# Patient Record
Sex: MALE | Race: WHITE | NOT HISPANIC OR LATINO | Employment: OTHER | URBAN - METROPOLITAN AREA
[De-identification: names, ages, dates, MRNs, and addresses within clinical notes are randomized per-mention and may not be internally consistent; named-entity substitution may affect disease eponyms.]

---

## 2024-04-30 ENCOUNTER — TELEPHONE (OUTPATIENT)
Dept: UROLOGY | Facility: CLINIC | Age: 69
End: 2024-04-30

## 2024-04-30 ENCOUNTER — PREP FOR PROCEDURE (OUTPATIENT)
Dept: INTERVENTIONAL RADIOLOGY/VASCULAR | Facility: CLINIC | Age: 69
End: 2024-04-30

## 2024-04-30 DIAGNOSIS — N40.0 BENIGN PROSTATIC HYPERPLASIA, UNSPECIFIED WHETHER LOWER URINARY TRACT SYMPTOMS PRESENT: Primary | ICD-10-CM

## 2024-04-30 NOTE — TELEPHONE ENCOUNTER
----- Message from Umberto Monterroso MD sent at 4/30/2024 12:45 PM EDT -----  Jeff Patino,    Good call on having one of us perform a quick workup.  I think a cystoscopy would be indicated to get a sense of volume and the status of his bladder.  If he had this done at [facility], than no need to start at square one....    Willi Merrill is set up at our [place] office, or I am happy to see him at [place].    Melida could you set the patient up to see either one of us ASAP     Thanks!

## 2024-04-30 NOTE — TELEPHONE ENCOUNTER
I called and LVM for patient advising on him getting us records. [place] fax number was provided on

## 2024-05-01 NOTE — TELEPHONE ENCOUNTER
Pt called requesting mailing address for office to have previous urologist send records    Pt requesting to have a call to him once records are received.

## 2024-05-06 NOTE — TELEPHONE ENCOUNTER
Pt called and stated he was seen in the ER yesterday in [place] for a blocked catheter and has been irrigating it several times since but is still having issues. Pt looking for an appt sooner than next week. Informed pt we are still in need of records and advised him to request them once again.     Pt call back: [phone]

## 2024-05-06 NOTE — TELEPHONE ENCOUNTER
PT WAS NOTIFIED THAT THERE WERE NO SOONER APPTS. HE ALSO ASKED ME TO CALL HIS PREVIOUS UROLOGY OFFICE TO SEE IF THEY CAN FAX RECORDS INSTEAD OF MAILING THEM. WHEN I SPOKE WITH THEM THEY SAID THEY HAVE TO BE MAILED BECAUSE IT IS HANDLED BY A 3RD PARTY SERVICE AND THEY CAN'T FAX THEM. BUT SHE DID CHECK THE STATUS AND THEY WERE WORKING ON SENDING THEM.

## 2024-05-07 NOTE — TELEPHONE ENCOUNTER
Preethi warner's friend called to check if we received his medical records that were sent to us last week.     Please review

## 2024-05-07 NOTE — TELEPHONE ENCOUNTER
SPOKE WITH WHIT AND HE SAID THAT HE IS HAVING SOME RECORDS FAXED TO US POSSIBLY TODAY(5/7/24) AND THAT HE IS SENDING A DISC THROUGH THE MAIL FOR US TO GET UPLOADED TO HIS CHART.

## 2024-05-09 NOTE — PROGRESS NOTES
Referring Physician: No primary care provider on file.  A copy of this note was sent to the referring physician.       Diagnoses and all orders for this visit:    Urinary retention  -     Urine culture  -     CBC and Platelet; Future  -     Type and screen; Future  -     Protime-INR; Future  -     Basic metabolic panel; Future  -     Case request operating room: PROSTATECTOMY,SUPRAPUBIC LAPAROSCOPIC WITH ROBOTICS; Standing  -     Case request operating room: PROSTATECTOMY,SUPRAPUBIC LAPAROSCOPIC WITH ROBOTICS    Gross hematuria  -     CBC and Platelet; Future  -     Type and screen; Future  -     Protime-INR; Future  -     Basic metabolic panel; Future  -     Case request operating room: PROSTATECTOMY,SUPRAPUBIC LAPAROSCOPIC WITH ROBOTICS; Standing  -     Case request operating room: PROSTATECTOMY,SUPRAPUBIC LAPAROSCOPIC WITH ROBOTICS    Benign prostatic hyperplasia with urinary retention  -     CBC and Platelet; Future  -     Type and screen; Future  -     Protime-INR; Future  -     Basic metabolic panel; Future  -     Case request operating room: PROSTATECTOMY,SUPRAPUBIC LAPAROSCOPIC WITH ROBOTICS; Standing  -     Case request operating room: PROSTATECTOMY,SUPRAPUBIC LAPAROSCOPIC WITH ROBOTICS    Other orders  -     dutasteride (AVODART) 0.5 mg capsule; Take 0.5 mg by mouth daily  -     Multiple Vitamin (Multi-Vitamin) tablet; Take by mouth daily  -     Silodosin 8 MG CAPS  -     tadalafil (CIALIS) 10 MG tablet; Take 10 mg by mouth as needed  -     zolpidem (AMBIEN) 10 mg tablet; Take 10 mg by mouth daily as needed  -     Place sequential compression device; Standing  -     Nursing communication Patient instructions: Please drink 1 bottle 1 hour after dinner and 1 bottle 1 hour before bed on the night before your surgery. Please drink 1 hour before arriving to hospital for surgery; Standing  -     ceFAZolin (ANCEF) 2,000 mg in sodium chloride 0.9 % 50 mL IVPB            Assessment and plan:       1 catheter  dependent urinary retention and recalcitrant gross hematuria  -MRI demonstrates prostatic volumetrics of 5.1 x 6.3 x 7.6 (130-160 gram gland)  -Catheter dependent for 1 month, managed in [place]  -Cystoscopy performed today on the same day as my initial office evaluation demonstrates high volume bilobar hyperplasia with intravesical prostatic protrusion, mild to moderate bladder trabeculations, no bladder tumors, no focal or ongoing sources of hematuria are noted    2.  Mildly elevated PSA 4.8  -Outside MRI report reviewed dated May third 2024 describes 2 PI-RADS 3 lesions, we discussed that these are equivocal findings  -In my opinion, delaying surgical management for his urinary retention and significantly morbid gross hematuria to pursue a prostate biopsy for these equivocal findings is not recommended  - We discussed the tissue sampling will be obtained at the time of his suprapubic prostatectomy and then if cancer is identified in the specimen he would require radiation thereafter for definitive management    Cystoscopy was offered today in order to provide direct visualization of the lower urinary tract and provided guidance regarding different management options.  I recommended flexible cystoscopy for further evaluation.  Discussed the risks benefits and alternatives to this diagnostic procedure.  Risks include but are not limited to hematuria, pain, urinary tract infection, stricture formation, possible need for hospitalization.  Patient verbalized understanding and has elected to proceed.      Reviewed surgical management options for the patient's high-volume BPH.  Clearly he has ongoing morbidity with frequent ER visits due to ongoing gross hematuria.  I do not visualize any acute or focal bleeding on today cystoscopy that I think could be palliated with a cystoscopic procedure    We also discussed the patient's MRI findings which are equivocal for possible prostate cancer, PI-RADS 3.  Discussed that  typically PI-RADS 3 lesions are observed.    The patient has been scheduled for a biopsy with his established urologist and he has also gone ahead and schedule prostatic arterial embolization    We did discuss surgical options as well particular the form of a suprapubic prostatectomy.With regards to surgical resection, we discussed the benefits and risks, including but not limited to bleeding which may or may not require blood transfusion, infection, injury to other structures (bladder, ureters, rectum, nerves, & vascular /neural structures), ileus, DVT/PE, MI, CVA, urinary incontinence, impotence, failure to completely empty after the procedure. He had the opportunity to ask questions and his questions were answered to his satisfaction.    Percy has elected to move forward with a robotic suprapubic prostatectomy.  I have had a phone dialogue with my partner Dr. Cowan who performs a high-volume of this procedures and believes he can have the patient scheduled within the next 6 weeks.    A preop urine culture and preoperative labs were collected today    I have spent 75 minutes with Patient and family today in which greater than 50% of this time was spent in counseling/coordination of care regarding Diagnostic results, Prognosis, Risks and benefits of tx options, Instructions for management, Counseling / Coordination of care, Obtaining or reviewing history  , and Communicating with other healthcare professionals .    Please note this time includes cumulative time on the day of encounter, including reviewing medical records and/or coordinating care among the patient's other specialists.     Umberto Monterroso MD      Chief Complaint     Second opinion BPH      History of Present Illness     Percy Duenas is a 68 y.o. presents seeking additional opinion for BPH with catheter dependent urinary retention    Detailed Urologic History     - please refer to HPI    Review of Systems     Review of Systems    Constitutional: Negative for activity change and fatigue.   HENT: Negative for congestion.    Eyes: Negative for visual disturbance.   Respiratory: Negative for shortness of breath and wheezing.    Cardiovascular: Negative for chest pain and leg swelling.   Gastrointestinal: Negative for abdominal pain.   Endocrine: Negative for polyuria.   Genitourinary: Negative for dysuria, flank pain, hematuria and urgency.   Musculoskeletal: Negative for back pain.   Allergic/Immunologic: Negative for immunocompromised state.   Neurological: Negative for dizziness and numbness.   Psychiatric/Behavioral: Negative for dysphoric mood.   All other systems reviewed and are negative.          Allergies     Not on File    Physical Exam       Physical Exam  Constitutional:       General: He is not in acute distress.     Appearance: He is well-developed.   HENT:      Head: Normocephalic and atraumatic.   Cardiovascular:      Comments: Negative lower extremity edema  Pulmonary:      Effort: Pulmonary effort is normal.      Breath sounds: Normal breath sounds.   Abdominal:      Palpations: Abdomen is soft.   Musculoskeletal:         General: Normal range of motion.      Cervical back: Normal range of motion.   Skin:     General: Skin is warm.   Neurological:      Mental Status: He is alert and oriented to person, place, and time.   Psychiatric:         Behavior: Behavior normal.           Vital Signs  There were no vitals filed for this visit.      Current Medications     No current outpatient medications on file.      Active Problems     There is no problem list on file for this patient.        Past Medical History     No past medical history on file.      Surgical History     No past surgical history on file.      Family History     No family history on file.      Social History     Social History     Social History     Tobacco Use   Smoking Status Not on file   Smokeless Tobacco Not on file         Pertinent Lab Values     No results  "found for: \"CREATININE\"    No results found for: \"PSA\"    @RESULTRCNT(1H])@      Pertinent Imaging       No results found.      Portions of the record may have been created with voice recognition software.  Occasional wrong word or \"sound a like\" substitutions may have occurred due to the inherent limitations of voice recognition software.  In addition some of the content generated from this outpatient encounter includes information designed for patient education and/or communication back to the referring provider.  Read the chart carefully and recognize, using context, where substitutions have occurred.    "

## 2024-05-15 ENCOUNTER — APPOINTMENT (OUTPATIENT)
Dept: LAB | Facility: AMBULARY SURGERY CENTER | Age: 69
End: 2024-05-15
Payer: COMMERCIAL

## 2024-05-15 ENCOUNTER — OFFICE VISIT (OUTPATIENT)
Dept: UROLOGY | Facility: CLINIC | Age: 69
End: 2024-05-15
Payer: COMMERCIAL

## 2024-05-15 ENCOUNTER — TELEPHONE (OUTPATIENT)
Dept: UROLOGY | Facility: CLINIC | Age: 69
End: 2024-05-15

## 2024-05-15 VITALS
HEART RATE: 76 BPM | SYSTOLIC BLOOD PRESSURE: 126 MMHG | HEIGHT: 72 IN | BODY MASS INDEX: 28.71 KG/M2 | WEIGHT: 212 LBS | DIASTOLIC BLOOD PRESSURE: 84 MMHG | OXYGEN SATURATION: 99 %

## 2024-05-15 DIAGNOSIS — N40.1 BENIGN PROSTATIC HYPERPLASIA WITH URINARY RETENTION: ICD-10-CM

## 2024-05-15 DIAGNOSIS — R31.0 GROSS HEMATURIA: ICD-10-CM

## 2024-05-15 DIAGNOSIS — R33.9 URINARY RETENTION: Primary | ICD-10-CM

## 2024-05-15 DIAGNOSIS — R33.8 BENIGN PROSTATIC HYPERPLASIA WITH URINARY RETENTION: ICD-10-CM

## 2024-05-15 DIAGNOSIS — R33.9 URINARY RETENTION: ICD-10-CM

## 2024-05-15 LAB
ABO GROUP BLD: NORMAL
ANION GAP SERPL CALCULATED.3IONS-SCNC: 8 MMOL/L (ref 4–13)
BLD GP AB SCN SERPL QL: NEGATIVE
BUN SERPL-MCNC: 12 MG/DL (ref 5–25)
CALCIUM SERPL-MCNC: 9.2 MG/DL (ref 8.4–10.2)
CHLORIDE SERPL-SCNC: 106 MMOL/L (ref 96–108)
CO2 SERPL-SCNC: 26 MMOL/L (ref 21–32)
CREAT SERPL-MCNC: 1.24 MG/DL (ref 0.6–1.3)
ERYTHROCYTE [DISTWIDTH] IN BLOOD BY AUTOMATED COUNT: 12.2 % (ref 11.6–15.1)
GFR SERPL CREATININE-BSD FRML MDRD: 58 ML/MIN/1.73SQ M
GLUCOSE P FAST SERPL-MCNC: 107 MG/DL (ref 65–99)
HCT VFR BLD AUTO: 37.5 % (ref 36.5–49.3)
HGB BLD-MCNC: 11.9 G/DL (ref 12–17)
INR PPP: 1.07 (ref 0.84–1.19)
MCH RBC QN AUTO: 36.8 PG (ref 26.8–34.3)
MCHC RBC AUTO-ENTMCNC: 31.7 G/DL (ref 31.4–37.4)
MCV RBC AUTO: 116 FL (ref 82–98)
PLATELET # BLD AUTO: 258 THOUSANDS/UL (ref 149–390)
PMV BLD AUTO: 10.6 FL (ref 8.9–12.7)
POTASSIUM SERPL-SCNC: 4.4 MMOL/L (ref 3.5–5.3)
PROTHROMBIN TIME: 13.8 SECONDS (ref 11.6–14.5)
RBC # BLD AUTO: 3.23 MILLION/UL (ref 3.88–5.62)
RH BLD: POSITIVE
SODIUM SERPL-SCNC: 140 MMOL/L (ref 135–147)
SPECIMEN EXPIRATION DATE: NORMAL
WBC # BLD AUTO: 10.23 THOUSAND/UL (ref 4.31–10.16)

## 2024-05-15 PROCEDURE — 85610 PROTHROMBIN TIME: CPT

## 2024-05-15 PROCEDURE — 86850 RBC ANTIBODY SCREEN: CPT

## 2024-05-15 PROCEDURE — 87086 URINE CULTURE/COLONY COUNT: CPT | Performed by: UROLOGY

## 2024-05-15 PROCEDURE — 99205 OFFICE O/P NEW HI 60 MIN: CPT | Performed by: UROLOGY

## 2024-05-15 PROCEDURE — 87077 CULTURE AEROBIC IDENTIFY: CPT | Performed by: UROLOGY

## 2024-05-15 PROCEDURE — 80048 BASIC METABOLIC PNL TOTAL CA: CPT

## 2024-05-15 PROCEDURE — 36415 COLL VENOUS BLD VENIPUNCTURE: CPT

## 2024-05-15 PROCEDURE — 86900 BLOOD TYPING SEROLOGIC ABO: CPT

## 2024-05-15 PROCEDURE — 86901 BLOOD TYPING SEROLOGIC RH(D): CPT

## 2024-05-15 PROCEDURE — 85027 COMPLETE CBC AUTOMATED: CPT

## 2024-05-15 RX ORDER — ZOLPIDEM TARTRATE 10 MG/1
10 TABLET ORAL DAILY PRN
COMMUNITY

## 2024-05-15 RX ORDER — SILODOSIN 8 MG/1
CAPSULE ORAL
COMMUNITY
Start: 2024-02-16

## 2024-05-15 RX ORDER — DUTASTERIDE 0.5 MG/1
0.5 CAPSULE, LIQUID FILLED ORAL DAILY
COMMUNITY
Start: 2024-04-28

## 2024-05-15 RX ORDER — TADALAFIL 10 MG/1
10 TABLET ORAL AS NEEDED
COMMUNITY
Start: 2024-03-16

## 2024-05-17 ENCOUNTER — TELEPHONE (OUTPATIENT)
Age: 69
End: 2024-05-17

## 2024-05-17 DIAGNOSIS — N39.0 URINARY TRACT INFECTION WITHOUT HEMATURIA, SITE UNSPECIFIED: Primary | ICD-10-CM

## 2024-05-17 LAB — BACTERIA UR CULT: ABNORMAL

## 2024-05-17 RX ORDER — NITROFURANTOIN 25; 75 MG/1; MG/1
100 CAPSULE ORAL 2 TIMES DAILY
Qty: 10 CAPSULE | Refills: 0 | Status: SHIPPED | OUTPATIENT
Start: 2024-05-17

## 2024-05-17 NOTE — TELEPHONE ENCOUNTER
Pt called, sts that he was notified on MyChart that his lab results were in and pt sts that it looks like results are showing that he has UTI.  Pt requesting call to discuss results from 5/15/24.    Pt call back: [phone]

## 2024-05-17 NOTE — TELEPHONE ENCOUNTER
Patient called again for results and for an antibiotic. IF a script is needed please send to:  [facility] [place], [place] - [address]  [[id number]]     Explained that provider will review results and determine if antibiotic is needed.     Urine culture  Order: [id number]   Status: Final result       Visible to patient: Yes (seen)       Next appt: 06/03/2024 at 08:30 AM in Radiology (BE IR 3 (DISCOVERY))       Dx: Urinary retention    Specimen Information: Urine, Clean Catch   0 Result Notes  Urine Culture 50,000-59,000 cfu/ml Staphylococcus saprophyticus Abnormal    Routine testing of urine isolates of S. saprophyticus is not recommended per CLSI guidelines.  S. saprophyticus acute, uncomplicated urinary tract infections respond to concentrations achieved in urine of antimicrobial agents commonly used to treat them.  Antimicrobials commonly used to treat acute, uncomplicated UTI's are nitrofurantoin, fluoroquinolones or trimethoprim with or without sulfamethoxazole.

## 2024-05-20 ENCOUNTER — TELEPHONE (OUTPATIENT)
Age: 69
End: 2024-05-20

## 2024-05-20 NOTE — TELEPHONE ENCOUNTER
Patient called today stating that he's interested in scheduling robotic surgery of the prostate with Dr. Monterroso.    Please review.    Call back [phone]

## 2024-05-20 NOTE — TELEPHONE ENCOUNTER
On Wednesday 5/15 Dr. Monterroso placed a case for a RALP.     Called and spoke with patient and let him know that Cathleen is out of office, and will call him back this week. Patient understands.

## 2024-05-24 ENCOUNTER — PREP FOR PROCEDURE (OUTPATIENT)
Dept: UROLOGY | Facility: AMBULATORY SURGERY CENTER | Age: 69
End: 2024-05-24

## 2024-05-24 DIAGNOSIS — R33.8 BENIGN PROSTATIC HYPERPLASIA WITH URINARY RETENTION: Primary | ICD-10-CM

## 2024-05-24 DIAGNOSIS — N40.1 BENIGN PROSTATIC HYPERPLASIA WITH URINARY RETENTION: Primary | ICD-10-CM

## 2024-05-24 DIAGNOSIS — Z79.01 LONG TERM (CURRENT) USE OF ANTICOAGULANTS: ICD-10-CM

## 2024-05-24 DIAGNOSIS — N39.0 URINARY TRACT INFECTION WITHOUT HEMATURIA, SITE UNSPECIFIED: ICD-10-CM

## 2024-05-24 DIAGNOSIS — Z01.812 PRE-OPERATIVE LABORATORY EXAMINATION: ICD-10-CM

## 2024-05-24 DIAGNOSIS — Z01.810 PRE-OPERATIVE CARDIOVASCULAR EXAMINATION: ICD-10-CM

## 2024-05-24 DIAGNOSIS — Z01.818 PREOP EXAMINATION: ICD-10-CM

## 2024-05-24 NOTE — TELEPHONE ENCOUNTER
Spoke with patient and confirmed surgery date of: 6/26/2024  Type of surgery: Robotic Suprapubic Prostatectomy  Operating physician: Dr. Cowan  Location of surgery: [facility]    Verbally went over prep with patient on: 5/24/2024  NPO  Bowel prep? Yes  Hospital calls afternoon prior with arrival time -Calls Friday afternoon for Monday surgeries  Patient needs ride to and from surgery outpatient w/ 23 HR hold for observation  Pre-op testing to be done 2 weeks prior to surgery  CBC, BMP, PT/INR, PTT, Type and Screen, Urine Culture, EKG  Blood thinners:   Aspirin and vitamins 1 week prior to surgery  Clearances needed: Medical    Mailed/emailed to patient on:  Copy of packet scanned into Media on: 5/29/2024  Labs in packet  Soap / Bowel prep in packet  Pre-op & Post-op in packet  Dates of H&P and post-op if needed    Consent: in Media   If needed:    Medical Clearance:  Appt with: Patient to schedule  Appt date and time:  Date clearance form faxed:  Best fax number:

## 2024-05-29 NOTE — TELEPHONE ENCOUNTER
Verbally confirmed the appointment with Dr. Cowan on 06- with an arrival time of 10:45 at our [place] office, the address was provided to the patient.    Patient inquired if the PAT testing could be completed the same day as the pre-op appointment: he can have them done at any [facility]. walk in no appt needed. these are to be done 2 weeks prior to surgery.    Patient inquired why he needed to see his MD prior to seeing Dr. Cowan:-pre surgical clearance-review medical history and do a physical to make sure you are safe for surgery and anesthesia.    In addition I informed him he should receive his packet next week.    I had help with this VIA teams with Queenie Singh.

## 2024-05-31 NOTE — TELEPHONE ENCOUNTER
I called pt and I was able to speak with him. I verbally went over all of his questions. Will have his pre op testing completed on 6/7/24 after his appt with Dr. Cowan at the [place] office. Per request I emailed pt a copy of the directions for the [facility].   Impaired per MOCA (9 words, most of them adjectives)/Impaired naming Impaired naming

## 2024-05-31 NOTE — TELEPHONE ENCOUNTER
Pt called, sts that he is scheduled to have his PAT on 6/6/24 and has questions regarding how long it is going to take him to complete the labs and EKG.  Pt sts that he would liek to move his Pre Op appt with Dr. Cowan for the same say he is having PAT, but he needs to know how long his PAT will take to make arrangements to allow enough time to make it to Dr. Cowan appt.  Pt is requesting call back from  to go over it all.    Pt call back: [phone]

## 2024-06-06 NOTE — H&P (VIEW-ONLY)
Assessment/Plan:    Urinary retention  The patient has persistent urinary tension with a very enlarged prostate.  He has previous discussed options with Dr. Monterroso and elected to move forward with robotic prostatectomy.  We discussed this in more detail today as well as other options such as HoLEP and aqua ablation.      After discussion he still wants to move forward with robotic simple prostatectomy as scheduled for June 26.    We discussed the risks and benefits of a robotic simple prostatectomy.  It is almost guaranteed he will have some degree of urinary continence at first which should improve over time but in some and does not.  Other less common risks are urine leak as well as bleeding and regrowth of prostatic tissue over time.  There is also a small but real risk for bowel or major vascular injury.  We also discussed that surgery is very effective at helping with obstructive symptoms of does not always help with frequency and urgency issues.  Consent was obtained.          Subjective:      Patient ID: Percy Duenas is a 68 y.o. male.    HPI  Percy Duenas is a 68 y.o. presents with catheter dependent urinary retention associated with a large prostate who is scheduled for robot simple prostatectomy.    The patient has had a catheter in place for 1.5 months.  This has been associated with development of persistent clots for which she has been self irrigating.  He was seen by Dr. Monterroso to perform cystoscopy showing bilobar hyperplasia with intravesical prostatic extrusion with mild to moderate bladder trabeculations.  The patient's MRI of the prostate showed a gland measuring approximately 160 g in size.    The patient is eager to have surgery as he wants his catheter out as soon as possible.    The patient had an outside PSA which was apparently 4.8 resulting in a outside MRI report dated May third 2024 describes 2 PI-RADS 3 lesions and a conversation was had about these equivocal findings with  Dr. Monterroso and role for biopsy versus moving forward with surgery which will produce tissue sampling and potentially require adjuvant radiation if clinically significant prostate cancer is found    Patient has a prior history of gallbladder surgery after which she had significant gas pain.    Is not a blood thinners  Denies any cardiac issues    Past Surgical History:   Procedure Laterality Date    GALLBLADDER SURGERY          History reviewed. No pertinent past medical history.     AUA SYMPTOM SCORE      Flowsheet Row Most Recent Value   AUA SYMPTOM SCORE    How often have you had a sensation of not emptying your bladder completely after you finished urinating? 3 (P)     How often have you had to urinate again less than two hours after you finished urinating? 5 (P)     How often have you found you stopped and started again several times when you urinate? 1 (P)     How often have you found it difficult to postpone urination? 5 (P)     How often have you had a weak urinary stream? 5 (P)     How often have you had to push or strain to begin urination? 0 (P)     How many times did you most typically get up to urinate from the time you went to bed at night until the time you got up in the morning? 5 (P)     Quality of Life: If you were to spend the rest of your life with your urinary condition just the way it is now, how would you feel about that? 4 (P)     AUA SYMPTOM SCORE 24 (P)               Review of Systems   Constitutional:  Negative for chills and fever.   HENT:  Negative for ear pain and sore throat.    Eyes:  Negative for pain and visual disturbance.   Respiratory:  Negative for cough and shortness of breath.    Cardiovascular:  Negative for chest pain and palpitations.   Gastrointestinal:  Negative for abdominal pain and vomiting.   Genitourinary:  Positive for difficulty urinating. Negative for dysuria and hematuria.   Musculoskeletal:  Negative for arthralgias and back pain.   Skin:  Negative for color  "change and rash.   Neurological:  Negative for seizures and syncope.   All other systems reviewed and are negative.        Objective:      /72 (BP Location: Left arm, Patient Position: Sitting, Cuff Size: Standard)   Pulse 63   Ht 6' (1.829 m)   Wt 96.6 kg (213 lb)   SpO2 99%   BMI 28.89 kg/m²     No results found for: \"PSA\"       Physical Exam  Vitals reviewed.   Constitutional:       Appearance: Normal appearance. He is normal weight.   HENT:      Head: Normocephalic and atraumatic.   Eyes:      Pupils: Pupils are equal, round, and reactive to light.   Abdominal:      General: Abdomen is flat. There is no distension.      Palpations: There is no mass.      Tenderness: There is no abdominal tenderness. There is no right CVA tenderness, left CVA tenderness, guarding or rebound.      Hernia: A hernia is present.      Comments: Very small umbilical hernia   Genitourinary:     Comments: Davis catheter in place  Neurological:      General: No focal deficit present.      Mental Status: He is alert and oriented to person, place, and time.   Psychiatric:         Mood and Affect: Mood normal.         Thought Content: Thought content normal.           Orders  No orders of the defined types were placed in this encounter.    "

## 2024-06-07 ENCOUNTER — OFFICE VISIT (OUTPATIENT)
Dept: UROLOGY | Facility: AMBULATORY SURGERY CENTER | Age: 69
End: 2024-06-07
Payer: COMMERCIAL

## 2024-06-07 ENCOUNTER — LAB REQUISITION (OUTPATIENT)
Dept: LAB | Facility: HOSPITAL | Age: 69
End: 2024-06-07
Payer: COMMERCIAL

## 2024-06-07 ENCOUNTER — APPOINTMENT (OUTPATIENT)
Dept: LAB | Facility: CLINIC | Age: 69
End: 2024-06-07
Payer: COMMERCIAL

## 2024-06-07 ENCOUNTER — TRANSCRIBE ORDERS (OUTPATIENT)
Dept: LAB | Facility: CLINIC | Age: 69
End: 2024-06-07

## 2024-06-07 VITALS
WEIGHT: 213 LBS | HEIGHT: 72 IN | HEART RATE: 63 BPM | BODY MASS INDEX: 28.85 KG/M2 | SYSTOLIC BLOOD PRESSURE: 130 MMHG | OXYGEN SATURATION: 99 % | DIASTOLIC BLOOD PRESSURE: 72 MMHG

## 2024-06-07 DIAGNOSIS — Z01.810 ENCOUNTER FOR PREPROCEDURAL CARDIOVASCULAR EXAMINATION: ICD-10-CM

## 2024-06-07 DIAGNOSIS — I43 DILATED CARDIOMYOPATHY SECONDARY TO HEMOCHROMATOSIS (HCC): ICD-10-CM

## 2024-06-07 DIAGNOSIS — R33.8 OTHER RETENTION OF URINE: ICD-10-CM

## 2024-06-07 DIAGNOSIS — R33.9 URINARY RETENTION: ICD-10-CM

## 2024-06-07 DIAGNOSIS — Z01.812 PRE-OPERATIVE LABORATORY EXAMINATION: ICD-10-CM

## 2024-06-07 DIAGNOSIS — N40.1 BENIGN PROSTATIC HYPERPLASIA WITH LOWER URINARY TRACT SYMPTOMS: ICD-10-CM

## 2024-06-07 DIAGNOSIS — Z01.818 ENCOUNTER FOR OTHER PREPROCEDURAL EXAMINATION: ICD-10-CM

## 2024-06-07 DIAGNOSIS — Z01.810 PRE-OPERATIVE CARDIOVASCULAR EXAMINATION: ICD-10-CM

## 2024-06-07 DIAGNOSIS — N40.1 BENIGN LOCALIZED HYPERPLASIA OF PROSTATE WITH URINARY RETENTION: ICD-10-CM

## 2024-06-07 DIAGNOSIS — N40.1 BENIGN LOCALIZED HYPERPLASIA OF PROSTATE WITH URINARY RETENTION: Primary | ICD-10-CM

## 2024-06-07 DIAGNOSIS — Z01.818 PRE-OP TESTING: Primary | ICD-10-CM

## 2024-06-07 DIAGNOSIS — R33.8 BENIGN PROSTATIC HYPERPLASIA WITH URINARY RETENTION: ICD-10-CM

## 2024-06-07 DIAGNOSIS — Z79.01 LONG TERM (CURRENT) USE OF ANTICOAGULANTS: ICD-10-CM

## 2024-06-07 DIAGNOSIS — Z01.818 PREOP EXAMINATION: ICD-10-CM

## 2024-06-07 DIAGNOSIS — E83.119 DILATED CARDIOMYOPATHY SECONDARY TO HEMOCHROMATOSIS (HCC): ICD-10-CM

## 2024-06-07 DIAGNOSIS — N39.0 URINARY TRACT INFECTION WITHOUT HEMATURIA, SITE UNSPECIFIED: ICD-10-CM

## 2024-06-07 DIAGNOSIS — N40.1 BENIGN PROSTATIC HYPERPLASIA WITH URINARY RETENTION: ICD-10-CM

## 2024-06-07 DIAGNOSIS — R33.8 BENIGN LOCALIZED HYPERPLASIA OF PROSTATE WITH URINARY RETENTION: Primary | ICD-10-CM

## 2024-06-07 DIAGNOSIS — R33.8 BENIGN LOCALIZED HYPERPLASIA OF PROSTATE WITH URINARY RETENTION: ICD-10-CM

## 2024-06-07 LAB
ABO GROUP BLD: NORMAL
ALT SERPL W P-5'-P-CCNC: 9 U/L (ref 7–52)
ANION GAP SERPL CALCULATED.3IONS-SCNC: 10 MMOL/L (ref 4–13)
APTT PPP: 32 SECONDS (ref 23–37)
AST SERPL W P-5'-P-CCNC: 17 U/L (ref 13–39)
BASOPHILS # BLD AUTO: 0.05 THOUSANDS/ÂΜL (ref 0–0.1)
BASOPHILS NFR BLD AUTO: 1 % (ref 0–1)
BLD GP AB SCN SERPL QL: NEGATIVE
BUN SERPL-MCNC: 14 MG/DL (ref 5–25)
CALCIUM SERPL-MCNC: 9.4 MG/DL (ref 8.4–10.2)
CHLORIDE SERPL-SCNC: 107 MMOL/L (ref 96–108)
CO2 SERPL-SCNC: 25 MMOL/L (ref 21–32)
CREAT SERPL-MCNC: 1.06 MG/DL (ref 0.6–1.3)
EOSINOPHIL # BLD AUTO: 0.09 THOUSAND/ÂΜL (ref 0–0.61)
EOSINOPHIL NFR BLD AUTO: 2 % (ref 0–6)
ERYTHROCYTE [DISTWIDTH] IN BLOOD BY AUTOMATED COUNT: 11.9 % (ref 11.6–15.1)
FERRITIN SERPL-MCNC: 120 NG/ML (ref 24–336)
GFR SERPL CREATININE-BSD FRML MDRD: 71 ML/MIN/1.73SQ M
GLUCOSE SERPL-MCNC: 91 MG/DL (ref 65–140)
HCT VFR BLD AUTO: 44.1 % (ref 36.5–49.3)
HGB BLD-MCNC: 13.6 G/DL (ref 12–17)
IMM GRANULOCYTES # BLD AUTO: 0.02 THOUSAND/UL (ref 0–0.2)
IMM GRANULOCYTES NFR BLD AUTO: 0 % (ref 0–2)
INR PPP: 1.01 (ref 0.84–1.19)
LYMPHOCYTES # BLD AUTO: 1.32 THOUSANDS/ÂΜL (ref 0.6–4.47)
LYMPHOCYTES NFR BLD AUTO: 24 % (ref 14–44)
MCH RBC QN AUTO: 35.1 PG (ref 26.8–34.3)
MCHC RBC AUTO-ENTMCNC: 30.8 G/DL (ref 31.4–37.4)
MCV RBC AUTO: 114 FL (ref 82–98)
MONOCYTES # BLD AUTO: 0.33 THOUSAND/ÂΜL (ref 0.17–1.22)
MONOCYTES NFR BLD AUTO: 6 % (ref 4–12)
NEUTROPHILS # BLD AUTO: 3.79 THOUSANDS/ÂΜL (ref 1.85–7.62)
NEUTS SEG NFR BLD AUTO: 67 % (ref 43–75)
NRBC BLD AUTO-RTO: 0 /100 WBCS
PLATELET # BLD AUTO: 231 THOUSANDS/UL (ref 149–390)
PMV BLD AUTO: 10.7 FL (ref 8.9–12.7)
POTASSIUM SERPL-SCNC: 4.1 MMOL/L (ref 3.5–5.3)
PROTHROMBIN TIME: 13.2 SECONDS (ref 11.6–14.5)
RBC # BLD AUTO: 3.88 MILLION/UL (ref 3.88–5.62)
RH BLD: POSITIVE
SODIUM SERPL-SCNC: 142 MMOL/L (ref 135–147)
SPECIMEN EXPIRATION DATE: NORMAL
WBC # BLD AUTO: 5.6 THOUSAND/UL (ref 4.31–10.16)

## 2024-06-07 PROCEDURE — 85025 COMPLETE CBC W/AUTO DIFF WBC: CPT

## 2024-06-07 PROCEDURE — 36415 COLL VENOUS BLD VENIPUNCTURE: CPT

## 2024-06-07 PROCEDURE — 87077 CULTURE AEROBIC IDENTIFY: CPT

## 2024-06-07 PROCEDURE — 86900 BLOOD TYPING SEROLOGIC ABO: CPT | Performed by: UROLOGY

## 2024-06-07 PROCEDURE — 82728 ASSAY OF FERRITIN: CPT

## 2024-06-07 PROCEDURE — 93005 ELECTROCARDIOGRAM TRACING: CPT

## 2024-06-07 PROCEDURE — 80048 BASIC METABOLIC PNL TOTAL CA: CPT

## 2024-06-07 PROCEDURE — 87086 URINE CULTURE/COLONY COUNT: CPT

## 2024-06-07 PROCEDURE — 85730 THROMBOPLASTIN TIME PARTIAL: CPT

## 2024-06-07 PROCEDURE — 99213 OFFICE O/P EST LOW 20 MIN: CPT | Performed by: UROLOGY

## 2024-06-07 PROCEDURE — 87186 SC STD MICRODIL/AGAR DIL: CPT

## 2024-06-07 PROCEDURE — 85610 PROTHROMBIN TIME: CPT

## 2024-06-07 PROCEDURE — 86901 BLOOD TYPING SEROLOGIC RH(D): CPT | Performed by: UROLOGY

## 2024-06-07 PROCEDURE — 84460 ALANINE AMINO (ALT) (SGPT): CPT

## 2024-06-07 PROCEDURE — 84450 TRANSFERASE (AST) (SGOT): CPT

## 2024-06-07 PROCEDURE — 86850 RBC ANTIBODY SCREEN: CPT | Performed by: UROLOGY

## 2024-06-07 RX ORDER — ATENOLOL 25 MG/1
TABLET ORAL
COMMUNITY
Start: 2024-06-04

## 2024-06-07 NOTE — ASSESSMENT & PLAN NOTE
The patient has persistent urinary tension with a very enlarged prostate.  He has previous discussed options with Dr. Monterroso and elected to move forward with robotic prostatectomy.  We discussed this in more detail today as well as other options such as HoLEP and aqua ablation.      After discussion he still wants to move forward with robotic simple prostatectomy as scheduled for June 26.    We discussed the risks and benefits of a robotic simple prostatectomy.  It is almost guaranteed he will have some degree of urinary continence at first which should improve over time but in some and does not.  Other less common risks are urine leak as well as bleeding and regrowth of prostatic tissue over time.  There is also a small but real risk for bowel or major vascular injury.  We also discussed that surgery is very effective at helping with obstructive symptoms of does not always help with frequency and urgency issues.  Consent was obtained.

## 2024-06-09 LAB — BACTERIA UR CULT: ABNORMAL

## 2024-06-10 LAB
ATRIAL RATE: 57 BPM
P AXIS: 16 DEGREES
PR INTERVAL: 178 MS
QRS AXIS: -4 DEGREES
QRSD INTERVAL: 106 MS
QT INTERVAL: 476 MS
QTC INTERVAL: 463 MS
T WAVE AXIS: 44 DEGREES
VENTRICULAR RATE: 57 BPM

## 2024-06-10 PROCEDURE — 93010 ELECTROCARDIOGRAM REPORT: CPT | Performed by: INTERNAL MEDICINE

## 2024-06-13 ENCOUNTER — PROCEDURE VISIT (OUTPATIENT)
Dept: UROLOGY | Facility: AMBULATORY SURGERY CENTER | Age: 69
End: 2024-06-13
Payer: COMMERCIAL

## 2024-06-13 ENCOUNTER — ANESTHESIA EVENT (OUTPATIENT)
Dept: PERIOP | Facility: HOSPITAL | Age: 69
End: 2024-06-13
Payer: COMMERCIAL

## 2024-06-13 VITALS
SYSTOLIC BLOOD PRESSURE: 120 MMHG | WEIGHT: 193.81 LBS | HEART RATE: 79 BPM | DIASTOLIC BLOOD PRESSURE: 80 MMHG | HEIGHT: 71 IN | BODY MASS INDEX: 27.13 KG/M2

## 2024-06-13 DIAGNOSIS — R33.9 URINARY RETENTION: Primary | ICD-10-CM

## 2024-06-13 PROCEDURE — 51702 INSERT TEMP BLADDER CATH: CPT

## 2024-06-13 NOTE — PROGRESS NOTES
"6/13/2024  Percy Duenas is a 68 y.o. male  [id number]    Diagnosis:  Chief Complaint    Urinary Retention         Patient presents for routine catheter exchange managed by Dr. Cowan    Plan:  Patient is having prostatectomy in 2 weeks  Patient instructed to call with any questions or concerns in the meantime.       Vitals:    06/13/24 1121   BP: 120/80   Pulse: 79   Weight: 87.9 kg (193 lb 13 oz)   Height: 5' 11\" (1.803 m)           Procedure:      Bladder catheterization    Date/Time: 6/13/2024 11:30 AM    Performed by: Susan Chatman RN  Authorized by: Lonnie Cowan MD    Procedure details:     Catheter insertion:  Indwelling    Approach: natural orifice      Catheter type:  Davis    Catheter size:  22 Fr    Number of attempts:  1    Successful placement: yes      Urine characteristics:  Clear  Attached to leg bag - supplies given        Susan Chatman RN   "

## 2024-06-18 NOTE — PRE-PROCEDURE INSTRUCTIONS
Pre-Surgery Instructions:   Medication Instructions    atenolol (TENORMIN) 25 mg tablet Uses PRN- OK to take day of surgery    dutasteride (AVODART) 0.5 mg capsule Continue    MELATONIN PO Stop taking 7 days prior to surgery.    Multiple Vitamin (Multi-Vitamin) tablet Stop taking 7 days prior to surgery.    Omega-3 Fatty Acids (FISH OIL PO) Stop taking 7 days prior to surgery.    Silodosin 8 MG CAPS Continue    sulfamethoxazole-trimethoprim (BACTRIM DS) 800-160 mg per tablet Continue    tadalafil (CIALIS) 10 MG tablet Uses PRN- DO NOT take day of surgery    TURMERIC PO Stop taking 7 days prior to surgery.    zolpidem (AMBIEN) 10 mg tablet Ok to take night prior to procedure   Medication instructions for day surgery reviewed. Please use only a sip of water to take your instructed medications. Avoid all over the counter vitamins, supplements and NSAIDS for one week prior to surgery per anesthesia guidelines. Tylenol is ok to take as needed.     You will receive a call one business day prior to surgery with an arrival time and hospital directions. If your surgery is scheduled on a Monday, the hospital will be calling you on the Friday prior to your surgery. If you have not heard from anyone by 8pm, please call the hospital supervisor through the hospital  at [phone]. ([place] [phone] or [place] [phone]).    Do not eat or drink anything after midnight the night before your surgery, including candy, mints, lifesavers, or chewing gum. Do not drink alcohol 24hrs before your surgery. Try not to smoke at least 24hrs before your surgery.       Follow the pre surgery showering instructions as listed in the “My Surgical Experience Booklet” or otherwise provided by your surgeon's office. Do not use a blade to shave the surgical area 1 week before surgery. It is okay to use a clean electric clippers up to 24 hours before surgery. Do not apply any lotions, creams, including makeup, cologne, deodorant, or  perfumes after showering on the day of your surgery. Do not use dry shampoo, hair spray, hair gel, or any type of hair products.     No contact lenses, eye make-up, or artificial eyelashes. Remove nail polish, including gel polish, and any artificial, gel, or acrylic nails if possible. Remove all jewelry including rings and body piercing jewelry.     Wear causal clothing that is easy to take on and off. Consider your type of surgery.    Keep any valuables, jewelry, piercings at home. Please bring any specially ordered equipment (sling, braces) if indicated.    Arrange for a responsible person to drive you to and from the hospital on the day of your surgery. Please confirm the visitor policy for the day of your procedure when you receive your phone call with an arrival time.     Call the surgeon's office with any new illnesses, exposures, or additional questions prior to surgery.    Please reference your “My Surgical Experience Booklet” for additional information to prepare for your upcoming surgery.     Pt has 3 pre-surgical drinks with instructions from surgeon's office. Advised to call their office with any questions regarding this.    Pt has bowel prep instructions and materials. Advised to call surgeon's office w/ any questions regarding this.

## 2024-06-25 NOTE — ANESTHESIA PREPROCEDURE EVALUATION
Procedure:  PROSTATECTOMY,SUPRAPUBIC LAPAROSCOPIC WITH ROBOTICS (Abdomen)    Relevant Problems   No relevant active problems      Past Medical History:   Diagnosis Date    Colon polyp      Lab Results   Component Value Date    WBC 5.60 06/07/2024    HGB 13.6 06/07/2024    HCT 44.1 06/07/2024     (H) 06/07/2024     06/07/2024       Physical Exam    Airway    Mallampati score: II  TM Distance: >3 FB  Neck ROM: full     Dental   No notable dental hx     Cardiovascular  Rhythm: regular, Rate: normal    Pulmonary   Breath sounds clear to auscultation    Other Findings  Intercisor Distance > 3cm          Anesthesia Plan  ASA Score- 2     Anesthesia Type- general with ASA Monitors.         Additional Monitors:     Airway Plan: ETT.    Comment: Discussed benefits/risks of general anesthesia including possibility of mouth/throat pain, injury to lips/teeth, nausea/vomiting, and surgical pain along with more rare complications such as stroke, MI, pneumonia, aspiration, and injury to blood vessels. All questions answered.  .       Plan Factors-Exercise tolerance (METS): >4 METS.    Chart reviewed. EKG reviewed.  Existing labs reviewed.                   Induction- intravenous.    Postoperative Plan- Plan for postoperative opioid use. Planned trial extubation        Informed Consent- Anesthetic plan and risks discussed with patient.  I personally reviewed this patient with the CRNA. Discussed and agreed on the Anesthesia Plan with the CRNA..

## 2024-06-26 ENCOUNTER — HOSPITAL ENCOUNTER (OUTPATIENT)
Facility: HOSPITAL | Age: 69
Setting detail: OUTPATIENT SURGERY
Discharge: HOME | End: 2024-06-27
Attending: UROLOGY | Admitting: UROLOGY
Payer: COMMERCIAL

## 2024-06-26 ENCOUNTER — ANESTHESIA (OUTPATIENT)
Dept: PERIOP | Facility: HOSPITAL | Age: 69
End: 2024-06-26
Payer: COMMERCIAL

## 2024-06-26 DIAGNOSIS — R31.0 GROSS HEMATURIA: ICD-10-CM

## 2024-06-26 DIAGNOSIS — R33.8 BENIGN PROSTATIC HYPERPLASIA WITH URINARY RETENTION: ICD-10-CM

## 2024-06-26 DIAGNOSIS — N40.1 BENIGN PROSTATIC HYPERPLASIA WITH URINARY RETENTION: ICD-10-CM

## 2024-06-26 DIAGNOSIS — R33.9 URINARY RETENTION: ICD-10-CM

## 2024-06-26 LAB
ANION GAP SERPL CALCULATED.3IONS-SCNC: 8 MMOL/L (ref 4–13)
BUN SERPL-MCNC: 16 MG/DL (ref 5–25)
CALCIUM SERPL-MCNC: 8.7 MG/DL (ref 8.4–10.2)
CHLORIDE SERPL-SCNC: 106 MMOL/L (ref 96–108)
CO2 SERPL-SCNC: 21 MMOL/L (ref 21–32)
CREAT SERPL-MCNC: 1.33 MG/DL (ref 0.6–1.3)
GFR SERPL CREATININE-BSD FRML MDRD: 54 ML/MIN/1.73SQ M
GLUCOSE SERPL-MCNC: 135 MG/DL (ref 65–140)
HCT VFR BLD AUTO: 37.9 % (ref 36.5–49.3)
HGB BLD-MCNC: 11.8 G/DL (ref 12–17)
POTASSIUM SERPL-SCNC: 4.7 MMOL/L (ref 3.5–5.3)
SODIUM SERPL-SCNC: 135 MMOL/L (ref 135–147)

## 2024-06-26 PROCEDURE — 85014 HEMATOCRIT: CPT | Performed by: UROLOGY

## 2024-06-26 PROCEDURE — NC001 PR NO CHARGE: Performed by: PHYSICIAN ASSISTANT

## 2024-06-26 PROCEDURE — 88307 TISSUE EXAM BY PATHOLOGIST: CPT | Performed by: PATHOLOGY

## 2024-06-26 PROCEDURE — 55867 LAPS SURG PRST8ECT SMPL STOT: CPT | Performed by: PHYSICIAN ASSISTANT

## 2024-06-26 PROCEDURE — 80048 BASIC METABOLIC PNL TOTAL CA: CPT | Performed by: UROLOGY

## 2024-06-26 PROCEDURE — 85018 HEMOGLOBIN: CPT | Performed by: UROLOGY

## 2024-06-26 PROCEDURE — 55867 LAPS SURG PRST8ECT SMPL STOT: CPT | Performed by: UROLOGY

## 2024-06-26 RX ORDER — ROCURONIUM BROMIDE 10 MG/ML
INJECTION, SOLUTION INTRAVENOUS AS NEEDED
Status: DISCONTINUED | OUTPATIENT
Start: 2024-06-26 | End: 2024-06-26

## 2024-06-26 RX ORDER — HEPARIN SODIUM 5000 [USP'U]/ML
5000 INJECTION, SOLUTION INTRAVENOUS; SUBCUTANEOUS EVERY 8 HOURS SCHEDULED
Status: DISCONTINUED | OUTPATIENT
Start: 2024-06-26 | End: 2024-06-27 | Stop reason: HOSPADM

## 2024-06-26 RX ORDER — ACETAMINOPHEN 325 MG/1
975 TABLET ORAL ONCE
Status: COMPLETED | OUTPATIENT
Start: 2024-06-26 | End: 2024-06-26

## 2024-06-26 RX ORDER — ONDANSETRON 2 MG/ML
INJECTION INTRAMUSCULAR; INTRAVENOUS AS NEEDED
Status: DISCONTINUED | OUTPATIENT
Start: 2024-06-26 | End: 2024-06-26

## 2024-06-26 RX ORDER — MAGNESIUM HYDROXIDE 1200 MG/15ML
3000 LIQUID ORAL CONTINUOUS
Status: DISCONTINUED | OUTPATIENT
Start: 2024-06-26 | End: 2024-06-27

## 2024-06-26 RX ORDER — SODIUM CHLORIDE, SODIUM LACTATE, POTASSIUM CHLORIDE, CALCIUM CHLORIDE 600; 310; 30; 20 MG/100ML; MG/100ML; MG/100ML; MG/100ML
INJECTION, SOLUTION INTRAVENOUS CONTINUOUS PRN
Status: DISCONTINUED | OUTPATIENT
Start: 2024-06-26 | End: 2024-06-26

## 2024-06-26 RX ORDER — MAGNESIUM HYDROXIDE 1200 MG/15ML
LIQUID ORAL AS NEEDED
Status: DISCONTINUED | OUTPATIENT
Start: 2024-06-26 | End: 2024-06-26 | Stop reason: HOSPADM

## 2024-06-26 RX ORDER — CEFAZOLIN SODIUM 2 G/50ML
2000 SOLUTION INTRAVENOUS EVERY 8 HOURS
Status: COMPLETED | OUTPATIENT
Start: 2024-06-26 | End: 2024-06-27

## 2024-06-26 RX ORDER — SODIUM CHLORIDE, SODIUM LACTATE, POTASSIUM CHLORIDE, CALCIUM CHLORIDE 600; 310; 30; 20 MG/100ML; MG/100ML; MG/100ML; MG/100ML
125 INJECTION, SOLUTION INTRAVENOUS CONTINUOUS
Status: DISCONTINUED | OUTPATIENT
Start: 2024-06-26 | End: 2024-06-27

## 2024-06-26 RX ORDER — BUPIVACAINE HYDROCHLORIDE 5 MG/ML
INJECTION, SOLUTION EPIDURAL; INTRACAUDAL AS NEEDED
Status: DISCONTINUED | OUTPATIENT
Start: 2024-06-26 | End: 2024-06-26 | Stop reason: HOSPADM

## 2024-06-26 RX ORDER — OXYCODONE HYDROCHLORIDE 5 MG/1
5 TABLET ORAL EVERY 4 HOURS PRN
Status: DISCONTINUED | OUTPATIENT
Start: 2024-06-26 | End: 2024-06-27 | Stop reason: HOSPADM

## 2024-06-26 RX ORDER — HYDROMORPHONE HCL/PF 1 MG/ML
0.5 SYRINGE (ML) INJECTION
Status: DISCONTINUED | OUTPATIENT
Start: 2024-06-26 | End: 2024-06-26 | Stop reason: HOSPADM

## 2024-06-26 RX ORDER — FINASTERIDE 5 MG/1
5 TABLET, FILM COATED ORAL DAILY
Status: DISCONTINUED | OUTPATIENT
Start: 2024-06-27 | End: 2024-06-27 | Stop reason: HOSPADM

## 2024-06-26 RX ORDER — FENTANYL CITRATE/PF 50 MCG/ML
50 SYRINGE (ML) INJECTION
Status: DISCONTINUED | OUTPATIENT
Start: 2024-06-26 | End: 2024-06-26 | Stop reason: HOSPADM

## 2024-06-26 RX ORDER — CEFAZOLIN SODIUM 2 G/50ML
2000 SOLUTION INTRAVENOUS ONCE
Status: COMPLETED | OUTPATIENT
Start: 2024-06-26 | End: 2024-06-26

## 2024-06-26 RX ORDER — SENNOSIDES 8.6 MG
1 TABLET ORAL DAILY
Status: DISCONTINUED | OUTPATIENT
Start: 2024-06-27 | End: 2024-06-27 | Stop reason: HOSPADM

## 2024-06-26 RX ORDER — ONDANSETRON 2 MG/ML
4 INJECTION INTRAMUSCULAR; INTRAVENOUS EVERY 6 HOURS PRN
Status: DISCONTINUED | OUTPATIENT
Start: 2024-06-26 | End: 2024-06-27 | Stop reason: HOSPADM

## 2024-06-26 RX ORDER — PROPOFOL 10 MG/ML
INJECTION, EMULSION INTRAVENOUS AS NEEDED
Status: DISCONTINUED | OUTPATIENT
Start: 2024-06-26 | End: 2024-06-26

## 2024-06-26 RX ORDER — LIDOCAINE HYDROCHLORIDE 10 MG/ML
INJECTION, SOLUTION EPIDURAL; INFILTRATION; INTRACAUDAL; PERINEURAL AS NEEDED
Status: DISCONTINUED | OUTPATIENT
Start: 2024-06-26 | End: 2024-06-26

## 2024-06-26 RX ORDER — LABETALOL HYDROCHLORIDE 5 MG/ML
INJECTION, SOLUTION INTRAVENOUS AS NEEDED
Status: DISCONTINUED | OUTPATIENT
Start: 2024-06-26 | End: 2024-06-26

## 2024-06-26 RX ORDER — METOCLOPRAMIDE HYDROCHLORIDE 5 MG/ML
5 INJECTION INTRAMUSCULAR; INTRAVENOUS ONCE AS NEEDED
Status: DISCONTINUED | OUTPATIENT
Start: 2024-06-26 | End: 2024-06-26 | Stop reason: HOSPADM

## 2024-06-26 RX ORDER — FENTANYL CITRATE 50 UG/ML
INJECTION, SOLUTION INTRAMUSCULAR; INTRAVENOUS AS NEEDED
Status: DISCONTINUED | OUTPATIENT
Start: 2024-06-26 | End: 2024-06-26

## 2024-06-26 RX ORDER — SODIUM CHLORIDE 9 MG/ML
INJECTION, SOLUTION INTRAVENOUS CONTINUOUS PRN
Status: DISCONTINUED | OUTPATIENT
Start: 2024-06-26 | End: 2024-06-26

## 2024-06-26 RX ORDER — TRANEXAMIC ACID 10 MG/ML
INJECTION, SOLUTION INTRAVENOUS AS NEEDED
Status: DISCONTINUED | OUTPATIENT
Start: 2024-06-26 | End: 2024-06-26

## 2024-06-26 RX ORDER — DEXAMETHASONE SODIUM PHOSPHATE 10 MG/ML
INJECTION, SOLUTION INTRAMUSCULAR; INTRAVENOUS AS NEEDED
Status: DISCONTINUED | OUTPATIENT
Start: 2024-06-26 | End: 2024-06-26

## 2024-06-26 RX ORDER — LORAZEPAM 2 MG/ML
0.5 INJECTION INTRAMUSCULAR ONCE AS NEEDED
Status: DISCONTINUED | OUTPATIENT
Start: 2024-06-26 | End: 2024-06-26 | Stop reason: HOSPADM

## 2024-06-26 RX ORDER — ACETAMINOPHEN 325 MG/1
650 TABLET ORAL EVERY 6 HOURS SCHEDULED
Status: DISCONTINUED | OUTPATIENT
Start: 2024-06-26 | End: 2024-06-27 | Stop reason: HOSPADM

## 2024-06-26 RX ORDER — BACITRACIN, NEOMYCIN, POLYMYXIN B 400; 3.5; 5 [USP'U]/G; MG/G; [USP'U]/G
1 OINTMENT TOPICAL 2 TIMES DAILY
Status: DISCONTINUED | OUTPATIENT
Start: 2024-06-26 | End: 2024-06-27 | Stop reason: HOSPADM

## 2024-06-26 RX ORDER — DOCUSATE SODIUM 100 MG/1
100 CAPSULE, LIQUID FILLED ORAL 2 TIMES DAILY
Status: DISCONTINUED | OUTPATIENT
Start: 2024-06-26 | End: 2024-06-27 | Stop reason: HOSPADM

## 2024-06-26 RX ORDER — ONDANSETRON 2 MG/ML
4 INJECTION INTRAMUSCULAR; INTRAVENOUS ONCE AS NEEDED
Status: DISCONTINUED | OUTPATIENT
Start: 2024-06-26 | End: 2024-06-26 | Stop reason: HOSPADM

## 2024-06-26 RX ORDER — HYDROMORPHONE HCL/PF 1 MG/ML
SYRINGE (ML) INJECTION AS NEEDED
Status: DISCONTINUED | OUTPATIENT
Start: 2024-06-26 | End: 2024-06-26

## 2024-06-26 RX ORDER — MIDAZOLAM HYDROCHLORIDE 2 MG/2ML
INJECTION, SOLUTION INTRAMUSCULAR; INTRAVENOUS AS NEEDED
Status: DISCONTINUED | OUTPATIENT
Start: 2024-06-26 | End: 2024-06-26

## 2024-06-26 RX ORDER — OXYBUTYNIN CHLORIDE 5 MG/1
5 TABLET ORAL 2 TIMES DAILY
Status: DISCONTINUED | OUTPATIENT
Start: 2024-06-26 | End: 2024-06-27 | Stop reason: HOSPADM

## 2024-06-26 RX ADMIN — ROCURONIUM BROMIDE 20 MG: 10 INJECTION, SOLUTION INTRAVENOUS at 13:27

## 2024-06-26 RX ADMIN — ROCURONIUM BROMIDE 30 MG: 10 INJECTION, SOLUTION INTRAVENOUS at 14:19

## 2024-06-26 RX ADMIN — ROCURONIUM BROMIDE 50 MG: 10 INJECTION, SOLUTION INTRAVENOUS at 12:45

## 2024-06-26 RX ADMIN — TRANEXAMIC ACID 1000 MG: 10 INJECTION, SOLUTION INTRAVENOUS at 14:26

## 2024-06-26 RX ADMIN — HYDROMORPHONE HYDROCHLORIDE 0.5 MG: 1 INJECTION, SOLUTION INTRAMUSCULAR; INTRAVENOUS; SUBCUTANEOUS at 15:22

## 2024-06-26 RX ADMIN — ACETAMINOPHEN 650 MG: 325 TABLET, FILM COATED ORAL at 23:29

## 2024-06-26 RX ADMIN — MIDAZOLAM 2 MG: 1 INJECTION INTRAMUSCULAR; INTRAVENOUS at 12:34

## 2024-06-26 RX ADMIN — PROPOFOL 200 MG: 10 INJECTION, EMULSION INTRAVENOUS at 12:45

## 2024-06-26 RX ADMIN — ROCURONIUM BROMIDE 20 MG: 10 INJECTION, SOLUTION INTRAVENOUS at 15:22

## 2024-06-26 RX ADMIN — OXYBUTYNIN CHLORIDE 5 MG: 5 TABLET ORAL at 18:50

## 2024-06-26 RX ADMIN — ONDANSETRON 4 MG: 2 INJECTION INTRAMUSCULAR; INTRAVENOUS at 13:08

## 2024-06-26 RX ADMIN — LIDOCAINE HYDROCHLORIDE 50 MG: 10 INJECTION, SOLUTION EPIDURAL; INFILTRATION; INTRACAUDAL at 12:45

## 2024-06-26 RX ADMIN — FENTANYL CITRATE 50 MCG: 50 INJECTION INTRAMUSCULAR; INTRAVENOUS at 13:13

## 2024-06-26 RX ADMIN — SODIUM CHLORIDE, SODIUM LACTATE, POTASSIUM CHLORIDE, AND CALCIUM CHLORIDE: .6; .31; .03; .02 INJECTION, SOLUTION INTRAVENOUS at 12:00

## 2024-06-26 RX ADMIN — ROCURONIUM BROMIDE 20 MG: 10 INJECTION, SOLUTION INTRAVENOUS at 13:08

## 2024-06-26 RX ADMIN — HEPARIN SODIUM 5000 UNITS: 5000 INJECTION INTRAVENOUS; SUBCUTANEOUS at 21:35

## 2024-06-26 RX ADMIN — SUGAMMADEX 400 MG: 100 INJECTION, SOLUTION INTRAVENOUS at 15:57

## 2024-06-26 RX ADMIN — SODIUM CHLORIDE: 0.9 INJECTION, SOLUTION INTRAVENOUS at 12:52

## 2024-06-26 RX ADMIN — OXYCODONE HYDROCHLORIDE 5 MG: 5 TABLET ORAL at 23:29

## 2024-06-26 RX ADMIN — CEFAZOLIN SODIUM 2000 MG: 2 SOLUTION INTRAVENOUS at 19:05

## 2024-06-26 RX ADMIN — FENTANYL CITRATE 50 MCG: 50 INJECTION INTRAMUSCULAR; INTRAVENOUS at 12:48

## 2024-06-26 RX ADMIN — ACETAMINOPHEN 975 MG: 325 TABLET, FILM COATED ORAL at 10:45

## 2024-06-26 RX ADMIN — LABETALOL HYDROCHLORIDE 10 MG: 5 INJECTION, SOLUTION INTRAVENOUS at 15:22

## 2024-06-26 RX ADMIN — FENTANYL CITRATE 50 MCG: 50 INJECTION INTRAMUSCULAR; INTRAVENOUS at 12:43

## 2024-06-26 RX ADMIN — BACITRACIN ZINC, NEOMYCIN, POLYMYXIN B 1 LARGE APPLICATION: 400; 3.5; 5 OINTMENT TOPICAL at 20:05

## 2024-06-26 RX ADMIN — OXYCODONE HYDROCHLORIDE 5 MG: 5 TABLET ORAL at 18:54

## 2024-06-26 RX ADMIN — HYDROMORPHONE HYDROCHLORIDE 0.5 MG: 1 INJECTION, SOLUTION INTRAMUSCULAR; INTRAVENOUS; SUBCUTANEOUS at 14:53

## 2024-06-26 RX ADMIN — DOCUSATE SODIUM 100 MG: 100 CAPSULE, LIQUID FILLED ORAL at 18:50

## 2024-06-26 RX ADMIN — ROCURONIUM BROMIDE 30 MG: 10 INJECTION, SOLUTION INTRAVENOUS at 13:31

## 2024-06-26 RX ADMIN — DEXAMETHASONE SODIUM PHOSPHATE 10 MG: 10 INJECTION, SOLUTION INTRAMUSCULAR; INTRAVENOUS at 12:57

## 2024-06-26 RX ADMIN — SODIUM CHLORIDE, SODIUM LACTATE, POTASSIUM CHLORIDE, AND CALCIUM CHLORIDE 125 ML/HR: .6; .31; .03; .02 INJECTION, SOLUTION INTRAVENOUS at 18:51

## 2024-06-26 RX ADMIN — FENTANYL CITRATE 50 MCG: 50 INJECTION INTRAMUSCULAR; INTRAVENOUS at 13:32

## 2024-06-26 RX ADMIN — CEFAZOLIN SODIUM 2000 MG: 2 SOLUTION INTRAVENOUS at 12:46

## 2024-06-26 RX ADMIN — ACETAMINOPHEN 650 MG: 325 TABLET, FILM COATED ORAL at 18:50

## 2024-06-26 NOTE — INTERVAL H&P NOTE
H&P reviewed. After examining the patient I find no changes in the patients condition since the H&P had been written.    Vitals:    06/26/24 1033   BP: 154/74   Pulse: 91   Resp: 18   Temp: 98.4 °F (36.9 °C)   SpO2: 100%     The patient's preoperative urine culture grew Klebsiella and he was put on Bactrim antibiotic for this.    Exam  No acute distress  Regular rate and rhythm  Clear to auscultation bilaterally  Abdomen soft nontender nondistended  Lower extremities no edema bilaterally      Plan  Robotic simple prostatectomy.  Given the patient's prior history of pain from a gas we will try to use a lower pressure if possible and maximize desufflation before closing port sites

## 2024-06-26 NOTE — OP NOTE
OPERATIVE REPORT  PATIENT NAME: Percy Duenas    :  1955  MRN: [id number]  Pt Location: AN OR ROOM 04    SURGERY DATE: 2024    Surgeons and Role:     * Lonnie Cowan MD - Primary     * Hallie Liriano PA-C - Assisting    Side assistant was needed for assistance with suctioning tissue retraction and suture passage    Preop Diagnosis:  Urinary retention [R33.9]  Gross hematuria [R31.0]  Benign prostatic hyperplasia with urinary retention [N40.1, R33.8]    Post-Op Diagnosis Codes:     * Urinary retention [R33.9]     * Gross hematuria [R31.0]     * Benign prostatic hyperplasia with urinary retention [N40.1, R33.8]    Procedure(s):  PROSTATECTOMY.SUPRAPUBIC LAPAROSCOPIC WITH ROBOTICS    Specimen(s):  ID Type Source Tests Collected by Time Destination   1 : Prostate adenoma Tissue Prostate TISSUE EXAM Lonnie Cowan MD 2024 1529        Estimated Blood Loss:   400 mL    Drains:  Urethral Catheter Latex 22 Fr. (Active)   Number of days: 13       Urethral Catheter Non-latex;Three way 24 Fr. (Active)   Number of days: 0       Continuous Bladder Irrigation Three-way (Active)   Number of days: 0       Anesthesia Type:   General    Operative Indications:  Pt with gross hematuria and persistent urinary retention with cystoscopy showing bilobar hypertrophy and intravesical prostatic extrusion with gland size measuring approximately 140 g.  Patient was counseled on options including HoLEP versus robotic simple prostatectomy and elected for robotic simple prostatectomy.    Operative Findings:  Robotic simple prostatectomy performed with good coaptation of urethra to the bladder mucosa tissue afterwards and no signs of leak after first layer bladder closure performed.      Complications:   None    Procedure and Technique:    The patient was brought to the operating room.  Anesthesia was induced.  He was given antibiotics in the form of Ancef.  He was moved to dorsal lithotomy position.  He was prepped and  draped in standard sterile fashion.    A time-out was performed identifying the correct patient site and procedure.  A Davis catheter was placed into the bladder.  A Veress needle was used in the midline above the umbilicus to gain access to the peritoneum which was confirmed with a drop test of saline.  The abdomen was insufflated to a pressure 15 mmHg without issues.  A 8 mm robotic trocar was placed at the midline 18 cm cephalad to pubic symphysis.  A 30 camera was passed through the trocar.  The abdomen was surveyed.  The remainder of the port sites were mapped out and placed under direct vision after first anesthetizing each location with a mixture of bupivacaine with Exparel.  This included 3 other robotic ports as well as a 12 mm AirSeal port and a 5 mm port.    The placed was placed in steep Trendelenburg position.  The bladder was distended with saline.  He was then incised in the midline using cautery on the peritoneum and muscle and sharply incising the mucosa.  The bladder wall was quite thick.  He was open for a length of approximately 12 cm.  The balloon was taken down.  Stay sutures were placed at 4 locations in the quadrants of the incised bladder to create a open working field inside the bladder.    The prostate was examined.  It had intravesical protrusion but not a true median lobe.  The ureteral orifices were identified fairly close to the prostate..    Attention was now turned to simple prostatectomy.  The mucosa around the bladder neck was scored circumferentially.  It was then entered and the prostate adenoma was dissected with a combination of sharp and blunt dissection with cautery as needed.  A tenaculum was used through the 4th arm to help gain traction on the adenoma to facilitate visualization and dissection as we got deeper.  Care was taken to ensure the correct tissue plane throughout dissection.  There was moderate bleeding which was addressed as much as possible during dissection  with spot cautery.  As we got closer to removing the adenoma the catheter was advanced back into the bladder to ensure the urethra could be visualized.  The urethra was transected sharply and this freed the adenoma.  It was removed from the bladder and placed in a specimen bag.    Attention was now turned to obtaining hemostasis within the prostatic fossa.  Using a combination of monopolar and bipolar energy bleeding points in the fossa were cauterized with good effect.  Attention was now turned to mucosal advancement which was performed in a circumferential fashion using two 3-0 V lock sutures advancing the bladder neck mucosa to the urethra with good effect.     A new 24 [address] 3 way Davis catheter was then placed into the bladder without difficulty.  Attention was turned to bladder closure which was performed in 2 layers 1st using a 3-0 V lock and then a #2 Stratafix.  After the first layer of bladder was closed it was filled with approximately 200 cc of saline and did not show evidence of leak.  After the second layer bladder closure was performed the catheter was advanced fully into the bladder and balloon inflated with 20 cc and the catheter was hooked up to CBI.     The 12 mm assistant port was removed and the area closed with an endo-close using 0 Vicryl suture.    The robotic ports were then removed and the abdomen was desufflated.  The midline incision was opened further and the specimen bag was extracted through this site.  It was then closed using two 0 Vicryl sutures.  All port sites were then closed superficially with 4-0 Monocryl suture and skin glue.    The patient's urine remained a light pink on CBI.    The patient was woken from anesthesia transferred to PACU in stable condition.     A qualified resident physician was not available    Patient Disposition:  PACU       Plan:  The patient will be monitored overnight on CBI.  If his CBI is able to be weaned off with appropriate labs than he will be  discharged home with a catheter in place with plan for removal in 2 weeks after which the catheter will be removed.         SIGNATURE: Lonnie Cowan MD  DATE: June 26, 2024  TIME: 3:49 PM

## 2024-06-26 NOTE — PLAN OF CARE
Problem: Prexisting or High Potential for Compromised Skin Integrity  Goal: Skin integrity is maintained or improved  Description: INTERVENTIONS:  - Identify patients at risk for skin breakdown  - Assess and monitor skin integrity  - Assess and monitor nutrition and hydration status  - Monitor labs   - Assess for incontinence   - Turn and reposition patient  - Assist with mobility/ambulation  - Relieve pressure over bony prominences  - Avoid friction and shearing  - Provide appropriate hygiene as needed including keeping skin clean and dry  - Evaluate need for skin moisturizer/barrier cream  - Collaborate with interdisciplinary team   - Patient/family teaching  - Consider wound care consult   6/26/2024 1800 by Lorna French  Outcome: Progressing  6/26/2024 1800 by Lorna French  Outcome: Progressing     Problem: PAIN - ADULT  Goal: Verbalizes/displays adequate comfort level or baseline comfort level  Description: Interventions:  - Encourage patient to monitor pain and request assistance  - Assess pain using appropriate pain scale  - Administer analgesics based on type and severity of pain and evaluate response  - Implement non-pharmacological measures as appropriate and evaluate response  - Consider cultural and social influences on pain and pain management  - Notify physician/advanced practitioner if interventions unsuccessful or patient reports new pain  6/26/2024 1800 by Lorna French  Outcome: Progressing  6/26/2024 1800 by Lorna French  Outcome: Progressing     Problem: INFECTION - ADULT  Goal: Absence or prevention of progression during hospitalization  Description: INTERVENTIONS:  - Assess and monitor for signs and symptoms of infection  - Monitor lab/diagnostic results  - Monitor all insertion sites, i.e. indwelling lines, tubes, and drains  - Monitor endotracheal if appropriate and nasal secretions for changes in amount and color  - Wood appropriate cooling/warming therapies per order  - Administer  medications as ordered  - Instruct and encourage patient and family to use good hand hygiene technique  - Identify and instruct in appropriate isolation precautions for identified infection/condition  6/26/2024 1800 by Lorna French  Outcome: Progressing  6/26/2024 1800 by Lorna French  Outcome: Progressing  Goal: Absence of fever/infection during neutropenic period  Description: INTERVENTIONS:  - Monitor WBC    6/26/2024 1800 by Lorna French  Outcome: Progressing  6/26/2024 1800 by Lorna French  Outcome: Progressing     Problem: SAFETY ADULT  Goal: Patient will remain free of falls  Description: INTERVENTIONS:  - Educate patient/family on patient safety including physical limitations  - Instruct patient to call for assistance with activity   - Consult OT/PT to assist with strengthening/mobility   - Keep Call bell within reach  - Keep bed low and locked with side rails adjusted as appropriate  - Keep care items and personal belongings within reach  - Initiate and maintain comfort rounds  - Make Fall Risk Sign visible to staff  - Offer Toileting every 2 Hours, in advance of need  - Initiate/Maintain bed/chair alarm  - Obtain necessary fall risk management equipment  - Apply yellow socks and bracelet for high fall risk patients  - Consider moving patient to room near nurses station  6/26/2024 1800 by Lorna French  Outcome: Progressing  6/26/2024 1800 by Lorna French  Outcome: Progressing  Goal: Maintain or return to baseline ADL function  Description: INTERVENTIONS:  -  Assess patient's ability to carry out ADLs; assess patient's baseline for ADL function and identify physical deficits which impact ability to perform ADLs (bathing, care of mouth/teeth, toileting, grooming, dressing, etc.)  - Assess/evaluate cause of self-care deficits   - Assess range of motion  - Assess patient's mobility; develop plan if impaired  - Assess patient's need for assistive devices and provide as appropriate  - Encourage maximum  independence but intervene and supervise when necessary  - Involve family in performance of ADLs  - Assess for home care needs following discharge   - Consider OT consult to assist with ADL evaluation and planning for discharge  - Provide patient education as appropriate  6/26/2024 1800 by Lorna French  Outcome: Progressing  6/26/2024 1800 by Lorna French  Outcome: Progressing  Goal: Maintains/Returns to pre admission functional level  Description: INTERVENTIONS:  - Perform AM-PAC 6 Click Basic Mobility/ Daily Activity assessment daily.  - Set and communicate daily mobility goal to care team and patient/family/caregiver.   - Collaborate with rehabilitation services on mobility goals if consulted  - Perform Range of Motion 3 times a day.  - Reposition patient every 2 hours.  - Dangle patient 3 times a day  - Stand patient 3 times a day  - Ambulate patient 3 times a day  - Out of bed to chair 3 times a day   - Out of bed for meals 3 times a day  - Out of bed for toileting  - Record patient progress and toleration of activity level   6/26/2024 1800 by Lorna French  Outcome: Progressing  6/26/2024 1800 by Lorna French  Outcome: Progressing     Problem: DISCHARGE PLANNING  Goal: Discharge to home or other facility with appropriate resources  Description: INTERVENTIONS:  - Identify barriers to discharge w/patient and caregiver  - Arrange for needed discharge resources and transportation as appropriate  - Identify discharge learning needs (meds, wound care, etc.)  - Arrange for interpretive services to assist at discharge as needed  - Refer to Case Management Department for coordinating discharge planning if the patient needs post-hospital services based on physician/advanced practitioner order or complex needs related to functional status, cognitive ability, or social support system  6/26/2024 1800 by Lorna French  Outcome: Progressing  6/26/2024 1800 by Lorna French  Outcome: Progressing     Problem: Knowledge  Deficit  Goal: Patient/family/caregiver demonstrates understanding of disease process, treatment plan, medications, and discharge instructions  Description: Complete learning assessment and assess knowledge base.  Interventions:  - Provide teaching at level of understanding  - Provide teaching via preferred learning methods  6/26/2024 1800 by Lorna French  Outcome: Progressing  6/26/2024 1800 by Lorna French  Outcome: Progressing     Problem: Potential for Falls  Goal: Patient will remain free of falls  Description: INTERVENTIONS:  - Educate patient/family on patient safety including physical limitations  - Instruct patient to call for assistance with activity   - Consult OT/PT to assist with strengthening/mobility   - Keep Call bell within reach  - Keep bed low and locked with side rails adjusted as appropriate  - Keep care items and personal belongings within reach  - Initiate and maintain comfort rounds  - Make Fall Risk Sign visible to staff  - Offer Toileting every 2 Hours, in advance of need  - Initiate/Maintain bed/chair alarm  - Obtain necessary fall risk management equipment  - Apply yellow socks and bracelet for high fall risk patients  - Consider moving patient to room near nurses station  6/26/2024 1800 by Lorna French  Outcome: Progressing  6/26/2024 1800 by Lorna French  Outcome: Progressing

## 2024-06-26 NOTE — DISCHARGE INSTR - AVS FIRST PAGE
Mr. Duenas,    Your robotic simple prostatectomy surgery went well.  We removed a large adenoma from the prostate.  This should hopefully help you void well after catheter was removed.    Will plan to keep the catheter placed for 2 weeks as previously discussed to help your bladder heal from the incision created to perform surgery.  The nursing team will teach you how to take care of your nicolas catheter including how to drain it and switch from a leg bag to the larger bag.    Complete the antibiotic you have at home. We have prescribed an additional antibiotic that you will start 2 days prior to nicolas catheter removal.     Keep your incisions clean and dry.  The glue on your skin will fall off on its own in 3-4 weeks.      Drink plenty of fluids and eat when you are hungry.     You may shower at will, but do not submerge yourself in a bath or hot tub for 2 weeks.     It is normal to have some drainage around your catheter.  You may feel the need to urinate and you may notice some leakage around the catheter during this time.  You need not worry about this, but only worry if the catheter is not draining at all.      You may also notice a creamy yellow brown substance around where the catheter enters the penis, these are normal periurethral secretions and they do not signify infection.      You have been prescribed an aggressive bowel regimen (1 cap of MIRALAX DAILY, and 1 COLACE THREE TIMES PER DAY).  Compliance with this regimen is important to avoid constipation and straining.    You are encouraged to walk around and climb steps, but you should not do any lifting  of anything heavier than 10 pounds for 6 weeks.  A jug of milk weighs roughly 10 pounds, and as such do not lift anything heavier than the jug of milk.    Please call us if you have questions or concerns.  Willi,  Lonnie Cowan MD    [facility] [phone]    Suprapubic Prostatectomy   WHAT YOU NEED TO KNOW:   Suprapubic prostatectomy is surgery to  remove part or all of your prostate gland. Your prostate gland is found below your bladder and surrounds the top of your urethra. Your urethra is a tube that carries urine from your bladder to the outside of your body. You may need suprapubic prostatectomy if you have an enlarged prostate.       DISCHARGE INSTRUCTIONS:   Call your local emergency number (911 in the [place]) for any of the following:   You feel lightheaded, short of breath, and have chest pain.    You cough up blood.    Call your doctor or surgeon if:   Your leg feels warm, tender, and painful. It may look swollen and red.    Blood soaks through your bandage.    You feel the urge to urinate, but no urine comes out.    You have pain in your lower back or abdomen that does not go away.    Your scrotum becomes swollen.    You have a fever or chills.    Your wound is red, swollen, or draining pus.    You have bright red blood in your urine, or your urine is cloudy and smells bad.    Your urine stream becomes slower than normal, or you are urinating only small amounts.    You have questions or concerns about your condition or care.    Medicines:   Antibiotics  help prevent an infection.    Prescription pain medicine  may be given. Ask your healthcare provider how to take this medicine safely. Some prescription pain medicines contain acetaminophen. Do not take other medicines that contain acetaminophen without talking to your healthcare provider. Too much acetaminophen may cause liver damage. Prescription pain medicine may cause constipation. Ask your healthcare provider how to prevent or treat constipation.     Blood thinners  help prevent blood clots. Clots can cause strokes, heart attacks, and death. The following are general safety guidelines to follow while you are taking a blood thinner:    Watch for bleeding and bruising while you take blood thinners. Watch for bleeding from your gums or nose. Watch for blood in your urine and bowel movements. Use a soft  washcloth on your skin, and a soft toothbrush to brush your teeth. This can keep your skin and gums from bleeding. If you shave, use an electric shaver. Do not play contact sports.     Tell your dentist and other healthcare providers that you take a blood thinner. Wear a bracelet or necklace that says you take this medicine.     Do not start or stop any other medicines unless your healthcare provider tells you to. Many medicines cannot be used with blood thinners.    Take your blood thinner exactly as prescribed by your healthcare provider. Do not skip does or take less than prescribed. Tell your provider right away if you forget to take your blood thinner, or if you take too much.    Warfarin  is a blood thinner that you may need to take. The following are things you should be aware of if you take warfarin:     Foods and medicines can affect the amount of warfarin in your blood. Do not make major changes to your diet while you take warfarin. Warfarin works best when you eat about the same amount of vitamin K every day. Vitamin K is found in green leafy vegetables and certain other foods. Ask for more information about what to eat when you are taking warfarin.    You will need to see your healthcare provider for follow-up visits when you are on warfarin. You will need regular blood tests. These tests are used to decide how much medicine you need.    Take your medicine as directed.  Contact your healthcare provider if you think your medicine is not helping or if you have side effects. Tell him or her if you are allergic to any medicine. Keep a list of the medicines, vitamins, and herbs you take. Include the amounts, and when and why you take them. Bring the list or the pill bottles to follow-up visits. Carry your medicine list with you in case of an emergency.    Davis catheter care:  Keep the bag below your waist. If the bag is too high, urine will flow back into your bladder. This can cause an infection. Do not  pull on the catheter. This may cause pain and bleeding, and the catheter may come out. Do not let the catheter tubing kink or twist. A kink or twist will block the flow of urine.  Help decrease urine leakage:  After surgery, you may leak urine and have trouble controlling when you urinate. Ask for more information about the following ways to help decrease urine leakage:  Avoid caffeine.  It can cause problems with bladder control and increase your need to urinate.    Do pelvic floor muscle exercises.  They may help improve your bladder control. These exercises are done by tightening and relaxing your pelvic muscles. Ask how to do pelvic floor muscle exercises, and how often to do them.    Limit your liquid intake.  Drink smaller amounts of liquid throughout the day. Do not drink before bedtime. Ask if you should decrease the amount of liquid you drink each day. This may help you control your bladder.    Wear a pad or adult diapers.  These may help absorb leaking urine and decrease odor.    Return to your usual activities as directed:  Rest when you need to while you heal after surgery. Slowly start to do more each day. Return to your daily activities as directed. You may be able to return to your daily activities in 4 to 6 weeks.  Follow up with your doctor or surgeon as directed:  You may need to return to have your catheter removed. You may also need tests to check if you bladder empties completely when you urinate. Write down your questions so you remember to ask them during your visits.  © Copyright [employer]. 2022 Information is for End User's use only and may not be sold, redistributed or otherwise used for commercial purposes. All illustrations and images included in CareNotes® are the copyrighted property of [employer]M., Inc. or [employer] (deja mi)  The above information is an  only. It is not intended as medical advice for individual conditions or treatments. Talk to your doctor, nurse or  pharmacist before following any medical regimen to see if it is safe and effective for you.

## 2024-06-26 NOTE — ANESTHESIA POSTPROCEDURE EVALUATION
Post-Op Assessment Note    CV Status:  Stable    Pain management: satisfactory to patient       Mental Status:  Alert, awake and sleepy   Hydration Status:  Euvolemic   PONV Controlled:  Controlled   Airway Patency:  Patent     Post Op Vitals Reviewed: Yes    No anethesia notable event occurred.    Staff: Anesthesiologist, CRNA             AVSS

## 2024-06-27 ENCOUNTER — TELEPHONE (OUTPATIENT)
Dept: OTHER | Facility: HOSPITAL | Age: 69
End: 2024-06-27

## 2024-06-27 VITALS
RESPIRATION RATE: 17 BRPM | OXYGEN SATURATION: 96 % | TEMPERATURE: 97.8 F | SYSTOLIC BLOOD PRESSURE: 119 MMHG | DIASTOLIC BLOOD PRESSURE: 69 MMHG | WEIGHT: 213 LBS | HEART RATE: 78 BPM | BODY MASS INDEX: 28.85 KG/M2 | HEIGHT: 72 IN

## 2024-06-27 LAB
ANION GAP SERPL CALCULATED.3IONS-SCNC: 6 MMOL/L (ref 4–13)
BUN SERPL-MCNC: 14 MG/DL (ref 5–25)
CALCIUM SERPL-MCNC: 8.9 MG/DL (ref 8.4–10.2)
CHLORIDE SERPL-SCNC: 106 MMOL/L (ref 96–108)
CO2 SERPL-SCNC: 25 MMOL/L (ref 21–32)
CREAT SERPL-MCNC: 1.25 MG/DL (ref 0.6–1.3)
ERYTHROCYTE [DISTWIDTH] IN BLOOD BY AUTOMATED COUNT: 12.7 % (ref 11.6–15.1)
GFR SERPL CREATININE-BSD FRML MDRD: 58 ML/MIN/1.73SQ M
GLUCOSE SERPL-MCNC: 110 MG/DL (ref 65–140)
HCT VFR BLD AUTO: 37.2 % (ref 36.5–49.3)
HGB BLD-MCNC: 11.7 G/DL (ref 12–17)
MCH RBC QN AUTO: 35.8 PG (ref 26.8–34.3)
MCHC RBC AUTO-ENTMCNC: 31.5 G/DL (ref 31.4–37.4)
MCV RBC AUTO: 114 FL (ref 82–98)
PLATELET # BLD AUTO: 223 THOUSANDS/UL (ref 149–390)
PMV BLD AUTO: 10.2 FL (ref 8.9–12.7)
POTASSIUM SERPL-SCNC: 4.8 MMOL/L (ref 3.5–5.3)
RBC # BLD AUTO: 3.27 MILLION/UL (ref 3.88–5.62)
SODIUM SERPL-SCNC: 137 MMOL/L (ref 135–147)
WBC # BLD AUTO: 14.73 THOUSAND/UL (ref 4.31–10.16)

## 2024-06-27 PROCEDURE — 99024 POSTOP FOLLOW-UP VISIT: CPT | Performed by: UROLOGY

## 2024-06-27 PROCEDURE — 85027 COMPLETE CBC AUTOMATED: CPT | Performed by: UROLOGY

## 2024-06-27 PROCEDURE — NC001 PR NO CHARGE: Performed by: UROLOGY

## 2024-06-27 PROCEDURE — 80048 BASIC METABOLIC PNL TOTAL CA: CPT | Performed by: UROLOGY

## 2024-06-27 RX ORDER — OXYBUTYNIN CHLORIDE 5 MG/1
5 TABLET ORAL 3 TIMES DAILY PRN
Qty: 30 TABLET | Refills: 0 | Status: SHIPPED | OUTPATIENT
Start: 2024-06-27 | End: 2024-07-07

## 2024-06-27 RX ORDER — CEFDINIR 300 MG/1
300 CAPSULE ORAL EVERY 12 HOURS SCHEDULED
Qty: 6 CAPSULE | Refills: 0 | Status: SHIPPED | OUTPATIENT
Start: 2024-06-27 | End: 2024-07-02

## 2024-06-27 RX ORDER — DOCUSATE SODIUM 100 MG/1
100 CAPSULE, LIQUID FILLED ORAL 2 TIMES DAILY
Qty: 28 CAPSULE | Refills: 0 | Status: SHIPPED | OUTPATIENT
Start: 2024-06-27 | End: 2024-07-11

## 2024-06-27 RX ORDER — OXYCODONE HYDROCHLORIDE 10 MG/1
5 TABLET ORAL EVERY 6 HOURS PRN
Qty: 10 TABLET | Refills: 0 | Status: SHIPPED | OUTPATIENT
Start: 2024-06-27 | End: 2024-07-01 | Stop reason: SDUPTHER

## 2024-06-27 RX ORDER — HYDROMORPHONE HCL/PF 1 MG/ML
0.5 SYRINGE (ML) INJECTION EVERY 4 HOURS PRN
Status: CANCELLED | OUTPATIENT
Start: 2024-06-27

## 2024-06-27 RX ORDER — OXYCODONE HYDROCHLORIDE 10 MG/1
10 TABLET ORAL EVERY 4 HOURS PRN
Status: CANCELLED | OUTPATIENT
Start: 2024-06-27

## 2024-06-27 RX ORDER — POLYETHYLENE GLYCOL 3350 17 G/17G
17 POWDER, FOR SOLUTION ORAL DAILY
Qty: 238 G | Refills: 0 | Status: SHIPPED | OUTPATIENT
Start: 2024-06-27 | End: 2024-07-11

## 2024-06-27 RX ADMIN — SODIUM CHLORIDE, SODIUM LACTATE, POTASSIUM CHLORIDE, AND CALCIUM CHLORIDE 125 ML/HR: .6; .31; .03; .02 INJECTION, SOLUTION INTRAVENOUS at 02:12

## 2024-06-27 RX ADMIN — HEPARIN SODIUM 5000 UNITS: 5000 INJECTION INTRAVENOUS; SUBCUTANEOUS at 13:53

## 2024-06-27 RX ADMIN — BACITRACIN ZINC, NEOMYCIN, POLYMYXIN B 1 LARGE APPLICATION: 400; 3.5; 5 OINTMENT TOPICAL at 09:08

## 2024-06-27 RX ADMIN — FINASTERIDE 5 MG: 5 TABLET, FILM COATED ORAL at 09:07

## 2024-06-27 RX ADMIN — CEFAZOLIN SODIUM 2000 MG: 2 SOLUTION INTRAVENOUS at 05:09

## 2024-06-27 RX ADMIN — OXYBUTYNIN CHLORIDE 5 MG: 5 TABLET ORAL at 09:07

## 2024-06-27 RX ADMIN — HEPARIN SODIUM 5000 UNITS: 5000 INJECTION INTRAVENOUS; SUBCUTANEOUS at 05:10

## 2024-06-27 RX ADMIN — SENNOSIDES 8.6 MG: 8.6 TABLET, FILM COATED ORAL at 09:07

## 2024-06-27 RX ADMIN — ACETAMINOPHEN 650 MG: 325 TABLET, FILM COATED ORAL at 05:10

## 2024-06-27 RX ADMIN — DOCUSATE SODIUM 100 MG: 100 CAPSULE, LIQUID FILLED ORAL at 09:07

## 2024-06-27 RX ADMIN — ACETAMINOPHEN 650 MG: 325 TABLET, FILM COATED ORAL at 12:21

## 2024-06-27 RX ADMIN — OXYCODONE HYDROCHLORIDE 5 MG: 5 TABLET ORAL at 05:10

## 2024-06-27 RX ADMIN — OXYCODONE HYDROCHLORIDE 5 MG: 5 TABLET ORAL at 10:25

## 2024-06-27 NOTE — RESPIRATORY THERAPY NOTE
RT Protocol Note  Percy Duenas 68 y.o. male MRN: [id number]  Unit/Bed#: S -01 Encounter: [id number]    Assessment    Active Problems:  There are no active Hospital Problems.      Home Pulmonary Medications:         Past Medical History:   Diagnosis Date    Colon polyp      Social History     Socioeconomic History    Marital status: /Civil Union     Spouse name: None    Number of children: None    Years of education: None    Highest education level: None   Occupational History    None   Tobacco Use    Smoking status: Some Days     Types: Cigars    Smokeless tobacco: Never   Vaping Use    Vaping status: Never Used   Substance and Sexual Activity    Alcohol use: Yes     Comment: occ    Drug use: Never    Sexual activity: None   Other Topics Concern    None   Social History Narrative    None     Social Determinants of Health     Financial Resource Strain: Not on file   Food Insecurity: Not on file   Transportation Needs: Not on file   Physical Activity: Not on file   Stress: Not on file   Social Connections: Unknown (6/18/2024)    Received from [employer]     How often do you feel lonely or isolated from those around you? (Adult - for ages 18 years and over): Not on file   Intimate Partner Violence: Not on file   Housing Stability: Low Risk  (4/22/2024)    Received from [facility]    Housing Stability     Unstable Housing in the Last Year: Not on file       Subjective         Objective    Physical Exam:   Assessment Type: Assess only  General Appearance: Awake  Respiratory Pattern: Normal  Chest Assessment: Chest expansion symmetrical  Bilateral Breath Sounds: Clear    Vitals:  Blood pressure 127/73, pulse 91, temperature 97.9 °F (36.6 °C), resp. rate 18, height 6' (1.829 m), weight 96.6 kg (213 lb), SpO2 95%.          Imaging and other studies: I have personally reviewed pertinent reports.            Plan    Respiratory Plan: No distress/Pulmonary history        Resp  Comments: Pt admitted for urine retention, pt has no pulmonary hx, uses no O2 at home or uses nebulizers for respiratory

## 2024-06-27 NOTE — PLAN OF CARE
Problem: Prexisting or High Potential for Compromised Skin Integrity  Goal: Skin integrity is maintained or improved  Description: INTERVENTIONS:  - Identify patients at risk for skin breakdown  - Assess and monitor skin integrity  - Assess and monitor nutrition and hydration status  - Monitor labs   - Assess for incontinence   - Turn and reposition patient  - Assist with mobility/ambulation  - Relieve pressure over bony prominences  - Avoid friction and shearing  - Provide appropriate hygiene as needed including keeping skin clean and dry  - Evaluate need for skin moisturizer/barrier cream  - Collaborate with interdisciplinary team   - Patient/family teaching  - Consider wound care consult   Outcome: Progressing     Problem: PAIN - ADULT  Goal: Verbalizes/displays adequate comfort level or baseline comfort level  Description: Interventions:  - Encourage patient to monitor pain and request assistance  - Assess pain using appropriate pain scale  - Administer analgesics based on type and severity of pain and evaluate response  - Implement non-pharmacological measures as appropriate and evaluate response  - Consider cultural and social influences on pain and pain management  - Notify physician/advanced practitioner if interventions unsuccessful or patient reports new pain  Outcome: Progressing     Problem: INFECTION - ADULT  Goal: Absence or prevention of progression during hospitalization  Description: INTERVENTIONS:  - Assess and monitor for signs and symptoms of infection  - Monitor lab/diagnostic results  - Monitor all insertion sites, i.e. indwelling lines, tubes, and drains  - Monitor endotracheal if appropriate and nasal secretions for changes in amount and color  - [place] appropriate cooling/warming therapies per order  - Administer medications as ordered  - Instruct and encourage patient and family to use good hand hygiene technique  - Identify and instruct in appropriate isolation precautions for  identified infection/condition  Outcome: Progressing  Goal: Absence of fever/infection during neutropenic period  Description: INTERVENTIONS:  - Monitor WBC    Outcome: Progressing     Problem: SAFETY ADULT  Goal: Patient will remain free of falls  Description: INTERVENTIONS:  - Educate patient/family on patient safety including physical limitations  - Instruct patient to call for assistance with activity   - Consult OT/PT to assist with strengthening/mobility   - Keep Call bell within reach  - Keep bed low and locked with side rails adjusted as appropriate  - Keep care items and personal belongings within reach  - Initiate and maintain comfort rounds  - Make Fall Risk Sign visible to staff  - Offer Toileting every 2 Hours, in advance of need  - Initiate/Maintain bed alarm  - Obtain necessary fall risk management equipment  - Apply yellow socks and bracelet for high fall risk patients  - Consider moving patient to room near nurses station  Outcome: Progressing  Goal: Maintain or return to baseline ADL function  Description: INTERVENTIONS:  -  Assess patient's ability to carry out ADLs; assess patient's baseline for ADL function and identify physical deficits which impact ability to perform ADLs (bathing, care of mouth/teeth, toileting, grooming, dressing, etc.)  - Assess/evaluate cause of self-care deficits   - Assess range of motion  - Assess patient's mobility; develop plan if impaired  - Assess patient's need for assistive devices and provide as appropriate  - Encourage maximum independence but intervene and supervise when necessary  - Involve family in performance of ADLs  - Assess for home care needs following discharge   - Consider OT consult to assist with ADL evaluation and planning for discharge  - Provide patient education as appropriate  Outcome: Progressing  Goal: Maintains/Returns to pre admission functional level  Description: INTERVENTIONS:  - Perform AM-PAC 6 Click Basic Mobility/ Daily Activity  assessment daily.  - Set and communicate daily mobility goal to care team and patient/family/caregiver.   - Collaborate with rehabilitation services on mobility goals if consulted  - Perform Range of Motion 3 times a day.  - Reposition patient every 2 hours.  - Dangle patient 3 times a day  - Stand patient 3 times a day  - Ambulate patient 3 times a day  - Out of bed to chair 3 times a day   - Out of bed for meals 3 times a day  - Out of bed for toileting  - Record patient progress and toleration of activity level   Outcome: Progressing     Problem: DISCHARGE PLANNING  Goal: Discharge to home or other facility with appropriate resources  Description: INTERVENTIONS:  - Identify barriers to discharge w/patient and caregiver  - Arrange for needed discharge resources and transportation as appropriate  - Identify discharge learning needs (meds, wound care, etc.)  - Arrange for interpretive services to assist at discharge as needed  - Refer to Case Management Department for coordinating discharge planning if the patient needs post-hospital services based on physician/advanced practitioner order or complex needs related to functional status, cognitive ability, or social support system  Outcome: Progressing     Problem: Knowledge Deficit  Goal: Patient/family/caregiver demonstrates understanding of disease process, treatment plan, medications, and discharge instructions  Description: Complete learning assessment and assess knowledge base.  Interventions:  - Provide teaching at level of understanding  - Provide teaching via preferred learning methods  Outcome: Progressing     Problem: Potential for Falls  Goal: Patient will remain free of falls  Description: INTERVENTIONS:  - Educate patient/family on patient safety including physical limitations  - Instruct patient to call for assistance with activity   - Consult OT/PT to assist with strengthening/mobility   - Keep Call bell within reach  - Keep bed low and locked with side  rails adjusted as appropriate  - Keep care items and personal belongings within reach  - Initiate and maintain comfort rounds  - Make Fall Risk Sign visible to staff  - Offer Toileting every 2 Hours, in advance of need  - Initiate/Maintain bed alarm  - Obtain necessary fall risk management equipment  - Apply yellow socks and bracelet for high fall risk patients  - Consider moving patient to room near nurses station  Outcome: Progressing

## 2024-06-27 NOTE — DISCHARGE SUMMARY
Discharge Summary - Percy Duenas 68 y.o. male MRN: [id number]    Unit/Bed#: S -01 Encounter: [id number]    Admission Date: 6/26/2024     Discharge Date: 06/27/24    HPI: Percy Duenas is a 68 y.o. male who presented for suprapubic prostatectomy laparoscopic with robotics by Dr. Cowan     Procedure(s):  PROSTATECTOMY,SUPRAPUBIC LAPAROSCOPIC WITH ROBOTICS  Surgeon(s):  MD Hallie Rodriguez PA-C  6/26/2024    Hospital Course: Postop day 1 patient is progressing well.  Vital signs stable, afebrile.  CBI was clamped and urine iris in the afternoon.  No clots.  He is tolerating a normal diet, no nausea or vomiting.  Not passing gas, feels the urge to.  His pain is manageable with oxycodone.  Patient stable for discharge on the afternoon of 6/27/2024    Discharge Diagnosis: Urinary retention    Condition at Discharge: good    Discharge Medications:  See after visit summary for reconciled discharge medications provided to patient and family.  Patient was discharged home on home medications with the addition of oxybutynin, Tylenol, Colace, MiraLAX, oxycodone.     Discharge instructions/Information to patient and family:   See after visit summary for written and verbal information which has been provided to patient and family.      Provisions for Follow-Up Care:  See after visit summary for information related to follow-up care and any pertinent home health orders.      Disposition: Home    Planned Readmission: No    Discharge Statement   I spent 10 minutes discharging the patient. This time was spent on the day of discharge. I had direct contact with the patient on the day of discharge. Additional documentation is required if more than 30 minutes were spent on discharge.     Signature:   Michelle Bang PA-C  Date: 6/27/2024 Time: 2:02 PM

## 2024-06-27 NOTE — PROGRESS NOTES
Progress Note - Urology  Percy Duenas 1955, 68 y.o. male MRN: [id number]    Unit/Bed#: S -01 Encounter: [id number]        Assessment & Plan:  BPH  Urinary retention  -POD 1 suprapubic prostatectomy laparoscopic with robotics by Dr. Cowan  -Mild leukocytosis 14, likely reactive  -Creatinine stable 1.2  -Hemoglobin stable 11.7  -CBI clamped urine iris, no clots  -Pain manageable.  -Patient ambulatory  -Tolerating normal diet, no nausea or vomiting.  Not passing gas yet, feels the urge  -Patient stable for discharge    Subjective:   HPI: Seen at bedside went over his lab values today.  Discussed normal postoperative course.  He has ambulated today.  Tolerating normal diet, no nausea or vomiting.  Reports oxycodone not very helpful with his pain.  He feels the urge to pass gas however has not this a.m.  CBI was clamped and urine iris-colored.    Review of Systems   Constitutional:  Negative for chills and fever.   Respiratory:  Negative for cough and shortness of breath.    Cardiovascular:  Negative for chest pain and palpitations.   Gastrointestinal:  Positive for abdominal pain. Negative for vomiting.   Genitourinary:  Positive for hematuria.   Musculoskeletal:  Negative for arthralgias and back pain.   Skin:  Negative for color change and rash.   Neurological:  Negative for seizures and syncope.   All other systems reviewed and are negative.      Objective:    Vitals: Blood pressure 119/69, pulse 78, temperature 97.8 °F (36.6 °C), temperature source Oral, resp. rate 17, height 6' (1.829 m), weight 96.6 kg (213 lb), SpO2 96%.,Body mass index is 28.89 kg/m².    Physical Exam  Constitutional:       General: He is not in acute distress.     Appearance: Normal appearance. He is normal weight. He is not ill-appearing or toxic-appearing.   HENT:      Head: Normocephalic and atraumatic.      Right Ear: External ear normal.      Left Ear: External ear normal.      Nose: Nose normal.      Mouth/Throat:       Mouth: Mucous membranes are moist.   Eyes:      General: No scleral icterus.     Conjunctiva/sclera: Conjunctivae normal.   Cardiovascular:      Rate and Rhythm: Normal rate and regular rhythm.      Pulses: Normal pulses.      Heart sounds: Normal heart sounds. No murmur heard.     No gallop.   Pulmonary:      Effort: Pulmonary effort is normal. No respiratory distress.      Breath sounds: Normal breath sounds. No wheezing or rales.   Abdominal:      Comments: Surgical incisions clean, dry.  No erythema.  Abdomen soft, nondistended.  No guarding or rigidity   Musculoskeletal:         General: Normal range of motion.      Cervical back: Normal range of motion.   Skin:     General: Skin is warm.      Findings: No rash.   Neurological:      General: No focal deficit present.      Mental Status: He is alert and oriented to person, place, and time. Mental status is at baseline.   Psychiatric:         Mood and Affect: Mood normal.         Behavior: Behavior normal.         Thought Content: Thought content normal.         Judgment: Judgment normal.           Labs:  Recent Labs     06/27/24  0616   WBC 14.73*       Recent Labs     06/26/24 2013 06/27/24  0616   HGB 11.8* 11.7*     Recent Labs     06/26/24 2013 06/27/24  0616   HCT 37.9 37.2     Recent Labs     06/26/24 2013 06/27/24  0616   CREATININE 1.33* 1.25       History:    Past Medical History:   Diagnosis Date    Colon polyp      Social History     Socioeconomic History    Marital status: /Civil Union     Spouse name: None    Number of children: None    Years of education: None    Highest education level: None   Occupational History    None   Tobacco Use    Smoking status: Some Days     Types: Cigars    Smokeless tobacco: Never   Vaping Use    Vaping status: Never Used   Substance and Sexual Activity    Alcohol use: Yes     Comment: occ    Drug use: Never    Sexual activity: None   Other Topics Concern    None   Social History Narrative    None     Social  Determinants of Health     Financial Resource Strain: Not on file   Food Insecurity: Not on file   Transportation Needs: Not on file   Physical Activity: Not on file   Stress: Not on file   Social Connections: Unknown (6/18/2024)    Received from [facility]     How often do you feel lonely or isolated from those around you? (Adult - for ages 18 years and over): Not on file   Intimate Partner Violence: Not on file   Housing Stability: Low Risk  (4/22/2024)    Received from [facility]    Housing Stability     Unstable Housing in the Last Year: Not on file     Past Surgical History:   Procedure Laterality Date    COLONOSCOPY      GALLBLADDER SURGERY      HAND SURGERY Left     NM LAPS SURG [id number] RPBIC RAD W/NRV SPARING ROBOT N/A 6/26/2024    Procedure: PROSTATECTOMY,SUPRAPUBIC LAPAROSCOPIC WITH ROBOTICS;  Surgeon: Lonnie Cowan MD;  Location: [facility];  Service: Urology     History reviewed. No pertinent family history.    Michelle Bang PA-C  Date: 6/27/2024 Time: 1:55 PM

## 2024-06-27 NOTE — TELEPHONE ENCOUNTER
Percy is status post simple prostatectomy by Dr. Cowan 6/26/2024.  Please schedule trial of void in 2 weeks

## 2024-06-28 NOTE — TELEPHONE ENCOUNTER
"Patient called back. Relayed message \"Please call Percy and let him know that these are expected findings after his procedure and it was just done on 6/26. I would recommend that he increase his fluid intake to help keep everything flushing and moving through. He also can utilize Tylenol for pain and discomfort. I would avoid Motrin as it can increase bleeding. \" Patient appreciative.   "

## 2024-06-28 NOTE — TELEPHONE ENCOUNTER
LM to contact office either via 3dim or by calling. Did advise RN will send Apalyat message to see how he is feeling post op. If he calls back-please confirm appts and relay message.

## 2024-06-28 NOTE — TELEPHONE ENCOUNTER
Patient called in to report that he has been having small blood clots since his procedure on 6/26. Advised patient that this can be normal post op and to monitor for worsening symptoms. Patient is concerned as he has had an issue in the past with blood clots. Patient also reports that on his discharge summary to limit his fluid intake and would like to know if he can increase fluids now. Also is asking if he should be taking ibuprofen vs tylenol. Please advise.

## 2024-06-28 NOTE — TELEPHONE ENCOUNTER
Patient returning call. Confirmed upcoming appt, time and location.    Answered all his questions regarding TOV

## 2024-06-28 NOTE — TELEPHONE ENCOUNTER
Please call Percy and let him know that these are expected findings after his procedure and it was just done on 6/26.  I would recommend that he increase his fluid intake to help keep everything flushing and moving through.  He also can utilize Tylenol for pain and discomfort.  I would avoid Motrin as it can increase bleeding.

## 2024-07-01 ENCOUNTER — TELEPHONE (OUTPATIENT)
Age: 69
End: 2024-07-01

## 2024-07-01 DIAGNOSIS — N40.1 BENIGN PROSTATIC HYPERPLASIA WITH URINARY RETENTION: ICD-10-CM

## 2024-07-01 DIAGNOSIS — R33.8 BENIGN PROSTATIC HYPERPLASIA WITH URINARY RETENTION: ICD-10-CM

## 2024-07-01 PROCEDURE — 88307 TISSUE EXAM BY PATHOLOGIST: CPT | Performed by: PATHOLOGY

## 2024-07-01 RX ORDER — OXYCODONE HYDROCHLORIDE 10 MG/1
5 TABLET ORAL EVERY 6 HOURS PRN
Qty: 5 TABLET | Refills: 0 | Status: SHIPPED | OUTPATIENT
Start: 2024-07-01 | End: 2024-07-06

## 2024-07-01 NOTE — TELEPHONE ENCOUNTER
Patient had a prostatectomy on 6/26/2024. The physician assistant was suppose to prescribe Oxycodone 10 mg. And [facility] only gave him 5 mg. He states the pharmacy told him that they could only give him that because it was prescribed by an AP. Anything more would have to be prescribed by an MD. Please resend to [facility]. He also stated he is taking Miralax and Colace and he still has not had a bowel movement. He would like to try Dulcolax and he will call and let our staff know if there is any progress. Please call patient once his prescription of pain has been updated and completed. He is all out of medication.

## 2024-07-01 NOTE — TELEPHONE ENCOUNTER
Pain level 6 out of 10    No pain medications left, he had been taking 1/2 every 6 hours     Pharmacy only gave him #6 pills versus #10 that was ordered. Pharmacy told patient they could not give the full qty prescribed because it was prescribed by a PA rather than  a MD    Last BM was 6/26/24, taking TID colace and a capful of miralax daily since Friday. Discussed senokot,  he will start that and prune juice. Also advised to drink 64 ounces of water daily.    Discussed utilizing Tylenol and Ibuprofen. Patient request that the rest of his Rx qty #4 be sent to [facility] pharmacy on file    Please advise and patient request a call back     #[phone]

## 2024-07-01 NOTE — TELEPHONE ENCOUNTER
Please call Percy and let him know that it appears that they ordered enough medication to last him until 7/7.  Please ask him if he is out of the medication and is still having significant pain.  I would also recommend utilizing Tylenol and ibuprofen for pain and discomfort especially if he is experiencing constipation and has to use over-the-counter stool softeners.

## 2024-07-06 ENCOUNTER — NURSE TRIAGE (OUTPATIENT)
Dept: OTHER | Facility: OTHER | Age: 69
End: 2024-07-06

## 2024-07-06 DIAGNOSIS — N32.89 BLADDER SPASMS: Primary | ICD-10-CM

## 2024-07-06 DIAGNOSIS — R33.9 URINARY RETENTION: Primary | ICD-10-CM

## 2024-07-06 RX ORDER — PHENAZOPYRIDINE HYDROCHLORIDE 100 MG/1
100 TABLET, FILM COATED ORAL 3 TIMES DAILY PRN
Qty: 6 TABLET | Refills: 0 | Status: SHIPPED | OUTPATIENT
Start: 2024-07-06 | End: 2024-07-08

## 2024-07-06 NOTE — TELEPHONE ENCOUNTER
"Regarding: Bladder spasms  ----- Message from Cait CANTU sent at 7/6/2024 12:16 PM EDT -----  Pt stated, \" I had an operation over a week ago I am getting bladder spasms and they wont go away. The medication  I am on does not seem to be helping .\"    "

## 2024-07-06 NOTE — TELEPHONE ENCOUNTER
"Answer Assessment - Initial Assessment Questions  1. SYMPTOM: \"What's the main symptom you're concerned about?\" (e.g., frequency, incontinence)      Bladder spasms, reports ongoing spasms feeling \"unreleasing\"  Taking ditropan TID, initially was relieving but no longer    2. ONSET: \"When did the  bladder spasms  start?\"      Approx 1 day ago  Was seen in ED, no blockage of catheter  Yellow urine, occasional minimal blood    3. PAIN: \"Is there any pain?\" If Yes, ask: \"How bad is it?\" (Scale: 1-10; mild, moderate, severe)      Denies    4. CAUSE: \"What do you think is causing the symptoms?\"      Recent prostate sx    5. OTHER SYMPTOMS: \"Do you have any other symptoms?\" (e.g., fever, flank pain, blood in urine, pain with urination)      Denies    Protocols used: Urinary Symptoms-ADULT-      On call provider called in pyridium, advised pt to continue with stool softer and progress to laxative if needed.  Pt reports is having regular BMs; not straining.    **Ligiapuja MENDOZA requested office follow up with pt on Monday for symptom reassessment.    Pt also asking if he needs to hold the ditropan prior to his voiding trial.  " 70

## 2024-07-09 RX ORDER — CEFDINIR 300 MG/1
300 CAPSULE ORAL EVERY 12 HOURS SCHEDULED
Qty: 4 CAPSULE | Refills: 0 | Status: SHIPPED | OUTPATIENT
Start: 2024-07-09 | End: 2024-07-11

## 2024-07-09 NOTE — TELEPHONE ENCOUNTER
PT called and stated he picked up the medication cefdinir (OMNICEF) 300 mg capsule  to begin taking today due to his nicolas removal is 7/11/24 and the directions does not match the amt prescribed. Please review for correct amt of medication pt is to take and for additional medication please call into following pharmacy    [facility] [address]  [address], [facility] [address]  Phone: [phone]  Fax: [phone]     Pt call [phone]

## 2024-07-11 ENCOUNTER — PROCEDURE VISIT (OUTPATIENT)
Dept: UROLOGY | Facility: CLINIC | Age: 69
End: 2024-07-11
Payer: COMMERCIAL

## 2024-07-11 VITALS — DIASTOLIC BLOOD PRESSURE: 82 MMHG | SYSTOLIC BLOOD PRESSURE: 130 MMHG | HEART RATE: 78 BPM | OXYGEN SATURATION: 97 %

## 2024-07-11 DIAGNOSIS — R33.8 BENIGN PROSTATIC HYPERPLASIA WITH URINARY RETENTION: Primary | ICD-10-CM

## 2024-07-11 DIAGNOSIS — N40.1 BENIGN PROSTATIC HYPERPLASIA WITH URINARY RETENTION: Primary | ICD-10-CM

## 2024-07-11 LAB — POST-VOID RESIDUAL VOLUME, ML POC: 50 ML

## 2024-07-11 PROCEDURE — 99024 POSTOP FOLLOW-UP VISIT: CPT

## 2024-07-11 PROCEDURE — 51798 US URINE CAPACITY MEASURE: CPT

## 2024-07-11 NOTE — PROGRESS NOTES
7/11/2024    Percy Duenas  1955  [id number]    Diagnosis      Patient presents for nicolas removal/TOV managed by our office    Plan  Patient will return for his PO appt with Dr. Cowan. Advised to contact office with any questions or concerns.    Procedure Nicolas removal/voiding trial    Nicolas catheter removed after deflation of an intact balloon. Patient tolerated well. Encouraged patient to hydrate well and return this afternoon for post void residual.  he knows he may return early if uncomfortable and unable to urinate. Patient agrees to this plan.    Patient returned this afternoon. Patient states able to void. Patient voided 0 ml while in office. Bladder ultrasound performed and PVR measured 50ml.    Recent Results (from the past 4 hour(s))   POCT Measure PVR    Collection Time: 07/11/24  2:26 PM   Result Value Ref Range    POST-VOID RESIDUAL VOLUME, ML POC 50 mL           Vitals:    07/11/24 0818   BP: 130/82   BP Location: Left arm   Patient Position: Sitting   Cuff Size: Adult   Pulse: 78   SpO2: 97%           Ligia Perez RN

## 2024-07-17 ENCOUNTER — TELEPHONE (OUTPATIENT)
Dept: UROLOGY | Facility: CLINIC | Age: 69
End: 2024-07-17

## 2024-07-17 NOTE — TELEPHONE ENCOUNTER
I called and asked patient if that date and time would work for him and he said yes. Please schedule. Date, time, and location has been reviewed. He is wondering if he needs to fast for this appointment, if he needs blood work, and if he will be giving a urine sample to check for a UTI at this visit.

## 2024-07-17 NOTE — TELEPHONE ENCOUNTER
Called and spoke with the patient who is asking if he needs to fast or have blood work done prior to his appointment.    Advised patient to be prepared to give an urine specimen and mostly likely an US of the bladder will be performed to check bladder emptying.    Patient is aware  he does not need to fast or obtain any blood work.    Patient scheduled for 7/29/2024 at 315 pm with Dr Cowan at the [facility].

## 2024-07-29 ENCOUNTER — OFFICE VISIT (OUTPATIENT)
Dept: UROLOGY | Facility: AMBULATORY SURGERY CENTER | Age: 69
End: 2024-07-29

## 2024-07-29 VITALS
HEIGHT: 72 IN | SYSTOLIC BLOOD PRESSURE: 130 MMHG | BODY MASS INDEX: 28.58 KG/M2 | WEIGHT: 211 LBS | DIASTOLIC BLOOD PRESSURE: 80 MMHG

## 2024-07-29 DIAGNOSIS — R97.20 ELEVATED PSA: Primary | ICD-10-CM

## 2024-07-29 DIAGNOSIS — N52.34 ERECTILE DYSFUNCTION FOLLOWING SIMPLE PROSTATECTOMY: ICD-10-CM

## 2024-07-29 DIAGNOSIS — R33.9 URINARY RETENTION: ICD-10-CM

## 2024-07-29 PROCEDURE — 99024 POSTOP FOLLOW-UP VISIT: CPT | Performed by: UROLOGY

## 2024-07-29 RX ORDER — SILDENAFIL 100 MG/1
100 TABLET, FILM COATED ORAL DAILY PRN
Qty: 18 TABLET | Refills: 11 | Status: SHIPPED | OUTPATIENT
Start: 2024-07-29

## 2024-07-29 RX ORDER — TADALAFIL 20 MG/1
20 TABLET ORAL AS NEEDED
COMMUNITY
Start: 2024-07-13

## 2024-07-29 NOTE — ASSESSMENT & PLAN NOTE
The patient has had worsening his erectile dysfunction with surgery.  This will hopefully improve with time but we discussed options of trying Viagra rather than Cialis.  I think he would be best served by Edex but he is hesitant to consider this yet.  Therefore Viagra 100mg has been ordered.

## 2024-07-29 NOTE — PROGRESS NOTES
Assessment/Plan:    Urinary retention  This has resolved with robotic prostatectomy.  He is voiding with a strong stream and easily.  He has minimal leakage.  He is overall quite happy with his symptoms.  We discussed the small risk of prostate regrowth over time.    Elevated PSA  The patient's pathology returned negative.  I think is unlikely he will develop prostate cancer in future but important to continue to check PSAs.  I have ordered a PSA for his next visit    Erectile dysfunction following simple prostatectomy  The patient has had worsening his erectile dysfunction with surgery.  This will hopefully improve with time but we discussed options of trying Viagra rather than Cialis.  I think he would be best served by Edex but he is hesitant to consider this yet.  Therefore Viagra 100mg has been ordered.          Subjective:      Patient ID: Percy Duenas is a 68 y.o. male.    HPI  Percy Duenas is a 68 y.o. presents with catheter dependent urinary retention associated with a large prostate who underwent robot simple prostatectomy June 2024.     The patient had a catheter in place for 3 months.  This has been associated with development of persistent clots for which he was self irrigating.  He was seen by Dr. Monterroso to perform cystoscopy showing bilobar hyperplasia with intravesical prostatic extrusion with mild to moderate bladder trabeculations.  The patient's MRI of the prostate showed a gland measuring approximately 160 g in size.     The patient is eager to have surgery as he wants his catheter out as soon as possible.  We performed a robotic simple prostatectomy June 2024 with pathology showing 92 g of benign tissue.    He is now voiding volitionally.  He has a strong stream.  He sometimes feels like his bladder is still sinus squeeze despite emptying fully.  He endorses minimal leakage at the volume of perhaps 1 tablespoon/day but denies leakage with coughing sneezing or laughing.  He is not sure  when he leaks small amounts over the course today.  PVR was 50 cc earlier this month.  AUA score 7/1     The patient had an outside PSA which was apparently 4.8 resulting in a outside MRI report dated May third 2024 describes 2 PI-RADS 3 lesions and a conversation was had about these equivocal findings with Dr. Monterroso and role for biopsy versus moving forward with surgery which will produce tissue sampling and potentially require adjuvant radiation if clinically significant prostate cancer is found.  Again pathology from robot simple prostatectomy was benign.    The patient does report issues with erectile dysfunction which he thinks are worse since surgery than before.  He has been trying Cialis 20 mg which is not adequate.    A. Prostate, excision:  - Glandular and stromal proliferation consistent with benign prostatic hyperplasia.  - Negative for malignancy.            Past Surgical History:   Procedure Laterality Date    COLONOSCOPY      GALLBLADDER SURGERY      HAND SURGERY Left     KY LAPS SURG [id number] RPBIC RAD W/NRV SPARING ROBOT N/A 6/26/2024    Procedure: PROSTATECTOMY,SUPRAPUBIC LAPAROSCOPIC WITH ROBOTICS;  Surgeon: Lonnie Cowan MD;  Location: [facility];  Service: Urology        Past Medical History:   Diagnosis Date    Colon polyp         AUA SYMPTOM SCORE      Flowsheet Row Most Recent Value   AUA SYMPTOM SCORE    How often have you had a sensation of not emptying your bladder completely after you finished urinating? 0 (P)     How often have you had to urinate again less than two hours after you finished urinating? 3 (P)     How often have you found you stopped and started again several times when you urinate? 0 (P)     How often have you found it difficult to postpone urination? 0 (P)     How often have you had a weak urinary stream? 0 (P)     How often have you had to push or strain to begin urination? 0 (P)     How many times did you most typically get up to urinate from the time you went to  "bed at night until the time you got up in the morning? 4 (P)     Quality of Life: If you were to spend the rest of your life with your urinary condition just the way it is now, how would you feel about that? 1 (P)     AUA SYMPTOM SCORE 7 (P)               Review of Systems   Constitutional:  Negative for chills and fever.   HENT:  Negative for ear pain and sore throat.    Eyes:  Negative for pain and visual disturbance.   Respiratory:  Negative for cough and shortness of breath.    Cardiovascular:  Negative for chest pain and palpitations.   Gastrointestinal:  Negative for abdominal pain and vomiting.   Genitourinary:  Negative for dysuria and hematuria.   Musculoskeletal:  Negative for arthralgias and back pain.   Skin:  Negative for color change and rash.   Neurological:  Negative for seizures and syncope.   All other systems reviewed and are negative.        Objective:      /80 (BP Location: Left arm, Patient Position: Sitting, Cuff Size: Adult)   Ht 6' (1.829 m)   Wt 95.7 kg (211 lb)   BMI 28.62 kg/m²     No results found for: \"PSA\"       Physical Exam  Vitals reviewed.   Constitutional:       Appearance: Normal appearance. He is normal weight.   HENT:      Head: Normocephalic and atraumatic.   Eyes:      Pupils: Pupils are equal, round, and reactive to light.   Abdominal:      General: Abdomen is flat. There is no distension.      Palpations: There is no mass.      Tenderness: There is no abdominal tenderness. There is no right CVA tenderness, left CVA tenderness, guarding or rebound.      Hernia: No hernia is present.      Comments: Incisions are well-healed without signs of hernia   Neurological:      General: No focal deficit present.      Mental Status: He is alert and oriented to person, place, and time.   Psychiatric:         Mood and Affect: Mood normal.         Thought Content: Thought content normal.           Orders  Orders Placed This Encounter   Procedures    PSA Total, Diagnostic     " Standing Status:   Future     Standing Expiration Date:   7/29/2025

## 2024-07-29 NOTE — ASSESSMENT & PLAN NOTE
This has resolved with robotic prostatectomy.  He is voiding with a strong stream and easily.  He has minimal leakage.  He is overall quite happy with his symptoms.  We discussed the small risk of prostate regrowth over time.

## 2024-07-29 NOTE — ASSESSMENT & PLAN NOTE
The patient's pathology returned negative.  I think is unlikely he will develop prostate cancer in future but important to continue to check PSAs.  I have ordered a PSA for his next visit

## 2025-01-23 ENCOUNTER — TELEPHONE (OUTPATIENT)
Age: 70
End: 2025-01-23

## 2025-01-23 NOTE — TELEPHONE ENCOUNTER
Pt returning call with fax number to his local lab. PSA order faxed to [phone] as requested. No further action is needed at this time.

## 2025-01-23 NOTE — TELEPHONE ENCOUNTER
Pt called to see what he needed for upcoming appt.  Advised there was an order in the system for a PSA.  Pt will call with a fax number to a local lab so he can obtain the bloodwork

## 2025-01-27 NOTE — TELEPHONE ENCOUNTER
PSA order faxed to [facility] at [phone] and confirmation was received.

## 2025-01-27 NOTE — TELEPHONE ENCOUNTER
Please fax PSA order to fax number below. WE did try to send it through epic and it did not work.       FAX: [phone] [facility]     Please call patient once this is done.     CB: [phone]

## 2025-01-27 NOTE — TELEPHONE ENCOUNTER
Pt called to check the status of this request, stated the lab informed him they have not received anything. Pt was advised the fax was sent and a confirmation was received, he understood and stated he will contact the lab once again to confirm.

## 2025-01-28 NOTE — TELEPHONE ENCOUNTER
PSA order faxed again to [facility] at [phone] and confirmation was received.

## 2025-01-29 ENCOUNTER — OFFICE VISIT (OUTPATIENT)
Dept: UROLOGY | Facility: AMBULATORY SURGERY CENTER | Age: 70
End: 2025-01-29

## 2025-01-29 VITALS
OXYGEN SATURATION: 99 % | DIASTOLIC BLOOD PRESSURE: 82 MMHG | BODY MASS INDEX: 28.17 KG/M2 | HEART RATE: 69 BPM | WEIGHT: 208 LBS | SYSTOLIC BLOOD PRESSURE: 122 MMHG | HEIGHT: 72 IN

## 2025-01-29 DIAGNOSIS — Z12.5 SCREENING FOR PROSTATE CANCER: ICD-10-CM

## 2025-01-29 DIAGNOSIS — N52.34 ERECTILE DYSFUNCTION FOLLOWING SIMPLE PROSTATECTOMY: ICD-10-CM

## 2025-01-29 DIAGNOSIS — R33.9 URINARY RETENTION: Primary | ICD-10-CM

## 2025-01-29 LAB — POST-VOID RESIDUAL VOLUME, ML POC: 48 ML

## 2025-01-29 RX ORDER — FOLIC ACID 0.4 MG
400 TABLET ORAL DAILY
COMMUNITY

## 2025-01-29 RX ORDER — NIRMATRELVIR AND RITONAVIR 300-100 MG
KIT ORAL
COMMUNITY
Start: 2024-12-26

## 2025-01-29 RX ORDER — QUETIAPINE FUMARATE 25 MG/1
25 TABLET, FILM COATED ORAL
COMMUNITY
Start: 2025-01-22

## 2025-01-29 RX ORDER — FAMOTIDINE 20 MG/1
1 TABLET, FILM COATED ORAL
COMMUNITY

## 2025-01-29 RX ORDER — ONDANSETRON 4 MG/1
4 TABLET, ORALLY DISINTEGRATING ORAL
COMMUNITY
Start: 2024-09-30

## 2025-01-29 RX ORDER — SILDENAFIL 100 MG/1
100 TABLET, FILM COATED ORAL DAILY PRN
Qty: 18 TABLET | Refills: 11 | Status: SHIPPED | OUTPATIENT
Start: 2025-01-29 | End: 2025-01-30 | Stop reason: ALTCHOICE

## 2025-01-29 RX ORDER — SILODOSIN 8 MG/1
8 CAPSULE ORAL
COMMUNITY

## 2025-01-29 RX ORDER — CEFDINIR 300 MG/1
300 CAPSULE ORAL DAILY
COMMUNITY

## 2025-01-29 RX ORDER — DUTASTERIDE 0.5 MG/1
0.5 CAPSULE, LIQUID FILLED ORAL DAILY
COMMUNITY

## 2025-01-29 RX ORDER — TADALAFIL 20 MG/1
20 TABLET ORAL AS NEEDED
Qty: 18 TABLET | Refills: 11 | Status: SHIPPED | OUTPATIENT
Start: 2025-01-29 | End: 2025-01-31 | Stop reason: ALTCHOICE

## 2025-01-29 RX ORDER — POTASSIUM CITRATE 10 MEQ/1
TABLET, EXTENDED RELEASE ORAL
COMMUNITY
Start: 2025-01-06

## 2025-01-29 NOTE — ASSESSMENT & PLAN NOTE
At his last office visit he was prescribed Viagra 100 mg as needed 1 hour prior to sexual activity.  Patient noted today that he has been using 40 mg of Cialis as needed 1 hour prior to sexual activity for treatment of his erectile dysfunction.  Additionally, the patient reports that the Viagra has not provided any benefit.  We discussed that the patient should not be exceeding 20 mg once daily for treatment of his erectile dysfunction as this raises cardiac risk and hypotensive effects  We discussed other options including intracavernosal injection therapy or the utilization of a penile pump, but the patient defers either these options as well.  Patient will continue on Cialis 20 mg once daily and will consider his options and follow-up in 3 months via virtual visit to discuss.

## 2025-01-29 NOTE — ASSESSMENT & PLAN NOTE
Status post robotic simple prostatectomy performed June 2024  PVR in the office today was found to be 48 mL  Patient is very satisfied with his voiding pattern off of pharmacotherapy following his simple prostatectomy procedure.  We will continue to monitor for worsening/progression of lower urinary tract symptoms

## 2025-01-29 NOTE — PROGRESS NOTES
1/29/2025      Assessment and Plan    69 y.o. male managed by Dr. Cowan    Urinary retention  Status post robotic simple prostatectomy performed June 2024  PVR in the office today was found to be 48 mL  Patient is very satisfied with his voiding pattern off of pharmacotherapy following his simple prostatectomy procedure.  We will continue to monitor for worsening/progression of lower urinary tract symptoms    Screening for prostate cancer  Patient previously underwent PSA testing in outside facility that was found to be 4.8 as well as a multiparametric MRI of the prostate performed May 3, 2024 which noted 2 PI-RADS category 3 lesions.  Patient underwent simple prostatectomy in June 2024 and 92 g of benign prostate tissue was noted on pathology  Patient's most recent PSA was performed 1/27/2025 and found to be 0.10.  We discussed that the patient can repeat his PSA in 1 year from his last.    Erectile dysfunction following simple prostatectomy  At his last office visit he was prescribed Viagra 100 mg as needed 1 hour prior to sexual activity.  Patient noted today that he has been using 40 mg of Cialis as needed 1 hour prior to sexual activity for treatment of his erectile dysfunction.  Additionally, the patient reports that the Viagra has not provided any benefit.  We discussed that the patient should not be exceeding 20 mg once daily for treatment of his erectile dysfunction as this raises cardiac risk and hypotensive effects  We discussed other options including intracavernosal injection therapy or the utilization of a penile pump, but the patient defers either these options as well.  Patient will continue on Cialis 20 mg once daily and will consider his options and follow-up in 3 months via virtual visit to discuss.        History of Present Illness  Percy Duenas is a 69 y.o. male here for evaluation of history of urinary retention requiring chronic Davis catheter so she with a large prostate who underwent  robotic simple prostatectomy performed June 2024.    Patient had been catheter dependent for 3 months for his urinary retention.  There was development of persistent clots for which she was self irrigating.  Dr. Monterroso performed cystoscopy which showed by lobar hyperplasia with intravesical prostatic protrusion with mild to moderate bladder trabeculations.  The patient's MRI of the prostate showed a gland measuring approximately 160 g in size.  Patient underwent simple prostatectomy in June 2024 with pathology showing 92 g of benign tissue.    At his last office visit on 7/29/2024, he noted he was voiding adequately with a very strong stream.  He endorsed minimal leakage and a volume of perhaps 1 tablespoon/day, but denied any stress urinary incontinence.    Patient underwent PSA testing at an outside facility which was apparently 4.8 resulting in an MRI of the prostate reported May 3, 2024 and greater the patient is having 2 PI-RADS category 3 lesions and a conversation was had about these equivocal findings with Dr. Monterroso.  There was a conversation with Dr. Monterroso of the role for biopsy versus moving forward with surgery which will produce tissue sampling and potentially require adjuvant radiation if clinically significant prostate cancer was found.  However, pathology from his simple prostatectomy was benign as noted above.    Furthermore, the patient reported issues of erectile dysfunction which she thinks are worse now since the surgery.  Patient had been trying Cialis 20 mg which was not providing adequate results.    At his last office visit, the patient the patient was instructed undergo PSA testing and was initiated on Viagra 100 mg as needed.  Patient's most recent PSA was performed 1/27/2025 and found to be 0.10.  Today, the patient reports that he is content with his voiding pattern following his procedure continues to void well.  Patient notes that he does intermittently experience very mild  degree of urinary incontinence and is going to reinitiate his Kegel exercises.  Furthermore, he notes that he has been using 40 mg of Cialis which has been providing adequate results for his erectile dysfunction.        Review of Systems   Constitutional:  Negative for chills and fever.   HENT:  Negative for ear pain and sore throat.    Eyes:  Negative for pain and visual disturbance.   Respiratory:  Negative for cough and shortness of breath.    Cardiovascular:  Negative for chest pain and palpitations.   Gastrointestinal:  Negative for abdominal pain and vomiting.   Genitourinary:  Negative for decreased urine volume, difficulty urinating, dysuria, flank pain, frequency, hematuria and urgency.   Musculoskeletal:  Negative for arthralgias and back pain.   Skin:  Negative for color change and rash.   Neurological:  Negative for seizures and syncope.   All other systems reviewed and are negative.               Vitals  Vitals:    01/29/25 1511   BP: 122/82   BP Location: Left arm   Patient Position: Sitting   Cuff Size: Standard   Pulse: 69   SpO2: 99%   Weight: 94.3 kg (208 lb)   Height: 6' (1.829 m)       Physical Exam  Vitals reviewed.   Constitutional:       General: He is not in acute distress.     Appearance: Normal appearance. He is not ill-appearing.   HENT:      Head: Normocephalic and atraumatic.      Nose: Nose normal.   Eyes:      General: No scleral icterus.  Pulmonary:      Effort: No respiratory distress.   Abdominal:      General: Abdomen is flat. There is no distension.      Palpations: Abdomen is soft.      Tenderness: There is no abdominal tenderness.   Musculoskeletal:         General: Normal range of motion.      Cervical back: Normal range of motion.   Skin:     General: Skin is warm.      Coloration: Skin is not jaundiced.   Neurological:      Mental Status: He is alert and oriented to person, place, and time.      Gait: Gait normal.   Psychiatric:         Mood and Affect: Mood normal.          "Behavior: Behavior normal.           Past History  Past Medical History:   Diagnosis Date    Colon polyp      Social History     Socioeconomic History    Marital status: /Civil Union     Spouse name: None    Number of children: None    Years of education: None    Highest education level: None   Occupational History    None   Tobacco Use    Smoking status: Some Days     Types: Cigars    Smokeless tobacco: Never   Vaping Use    Vaping status: Never Used   Substance and Sexual Activity    Alcohol use: Yes     Comment: occ    Drug use: Never    Sexual activity: None   Other Topics Concern    None   Social History Narrative    None     Social Drivers of Health     Financial Resource Strain: Not on file   Food Insecurity: Not on file   Transportation Needs: Not on file   Physical Activity: Not on file   Stress: Not on file   Social Connections: Unknown (6/18/2024)    Received from [facility]     How often do you feel lonely or isolated from those around you? (Adult - for ages 18 years and over): Not on file   Intimate Partner Violence: Not on file   Housing Stability: Low Risk  (4/22/2024)    [facility] from [facility]    Housing Stability     Unstable Housing in the Last Year: Not on file     Social History     Tobacco Use   Smoking Status Some Days    Types: Cigars   Smokeless Tobacco Never     History reviewed. No pertinent family history.    The following portions of the patient's history were reviewed and updated as appropriate: allergies, current medications, past medical history, past social history, past surgical history and problem list.    Results  Recent Results (from the past hour)   POCT Measure PVR    Collection Time: 01/29/25  3:23 PM   Result Value Ref Range    POST-VOID RESIDUAL VOLUME, ML POC 48 mL   ]  No results found for: \"PSA\"  Lab Results   Component Value Date    CALCIUM 8.9 06/27/2024    K 4.8 06/27/2024    CO2 25 06/27/2024     06/27/2024    BUN 14 " 06/27/2024    CREATININE 1.25 06/27/2024     Lab Results   Component Value Date    WBC 14.73 (H) 06/27/2024    HGB 11.7 (L) 06/27/2024    HCT 37.2 06/27/2024     (H) 06/27/2024     06/27/2024

## 2025-01-29 NOTE — ASSESSMENT & PLAN NOTE
Patient previously underwent PSA testing in outside facility that was found to be 4.8 as well as a multiparametric MRI of the prostate performed May 3, 2024 which noted 2 PI-RADS category 3 lesions.  Patient underwent simple prostatectomy in June 2024 and 92 g of benign prostate tissue was noted on pathology  Patient's most recent PSA was performed 1/27/2025 and found to be 0.10.  We discussed that the patient can repeat his PSA in 1 year from his last.

## 2025-01-30 ENCOUNTER — TELEPHONE (OUTPATIENT)
Age: 70
End: 2025-01-30

## 2025-01-30 NOTE — TELEPHONE ENCOUNTER
Patient should only have Cialis filled and not Viagra.  Will remove Viagra from the patient's medication list.

## 2025-01-30 NOTE — TELEPHONE ENCOUNTER
Called and spoke with patient. Informed on AP's note. He states that is not what was discussed at the office visit yesterday. He states that he was told 20mg of Cialis and 50mg of Viagra were going to be sent into the pharmacy for him. He states that each prescription was going to be for 100 tablets. Informed that it needs to be reviewed by the provider in order to be sent it. He states the 20mg of Cialis did not work for him.

## 2025-01-30 NOTE — TELEPHONE ENCOUNTER
Trey called in as he understood to take his viagr and cialas as follows:  Viagra 50mg and cialis 20mg as needed one hour prior to sexual intercourse.    Reviewed providers note:  Erectile dysfunction following simple prostatectomy  At his last office visit he was prescribed Viagra 100 mg as needed 1 hour prior to sexual activity.  Patient noted today that he has been using 40 mg of Cialis as needed 1 hour prior to sexual activity for treatment of his erectile dysfunction.  Additionally, the patient reports that the Viagra has not provided any benefit.  We discussed that the patient should not be exceeding 20 mg once daily for treatment of his erectile dysfunction as this raises cardiac risk and hypotensive effects  We discussed other options including intracavernosal injection therapy or the utilization of a penile pump, but the patient defers either these options as well.  Patient will continue on Cialis 20 mg once daily and will consider his options and follow-up in 3 months via virtual visit to discuss.    Reviewed medication sent to pharmacy was viagra 100mg PRN and cialis 20mg PRN. Pharmacy will not fill as they are similar medications and will need to clarify with them. Also pharmacy informed patient that Viagra comes in 50mg tablets and unsure why it was sent 100mg tablets.    Please clarify- note does not reflect-unable to clarify with pharmacy.

## 2025-01-31 DIAGNOSIS — N52.34 ERECTILE DYSFUNCTION FOLLOWING SIMPLE PROSTATECTOMY: Primary | ICD-10-CM

## 2025-01-31 RX ORDER — VARDENAFIL HYDROCHLORIDE 20 MG/1
20 TABLET ORAL DAILY PRN
Qty: 30 TABLET | Refills: 0 | Status: SHIPPED | OUTPATIENT
Start: 2025-01-31

## 2025-01-31 NOTE — TELEPHONE ENCOUNTER
I just called the patient on 1/31/2025 at 9:26 AM in regards to the medication confusion.  After discussion, we discussed that the patient should stick with 1 PDE 5 inhibitor and not trial combination as stated.  Additionally, we discussed alternatives in regards to trying a third PDE 5 inhibitor such as Levitra.  Patient will trial Levitra as needed 1 hour prior to sexual activity and we we will discuss in further detail at her follow-up in 3 months.  The Cialis and Viagra were removed from the patient's chart.

## 2025-02-28 PROBLEM — Z12.5 SCREENING FOR PROSTATE CANCER: Status: RESOLVED | Noted: 2025-01-29 | Resolved: 2025-02-28

## 2025-04-28 ENCOUNTER — TELEMEDICINE (OUTPATIENT)
Dept: UROLOGY | Facility: AMBULATORY SURGERY CENTER | Age: 70
End: 2025-04-28
Payer: COMMERCIAL

## 2025-04-28 DIAGNOSIS — Z12.5 SCREENING FOR PROSTATE CANCER: ICD-10-CM

## 2025-04-28 DIAGNOSIS — N52.34 ERECTILE DYSFUNCTION FOLLOWING SIMPLE PROSTATECTOMY: Primary | ICD-10-CM

## 2025-04-28 DIAGNOSIS — R33.9 URINARY RETENTION: ICD-10-CM

## 2025-04-28 PROCEDURE — 99213 OFFICE O/P EST LOW 20 MIN: CPT

## 2025-04-28 RX ORDER — TADALAFIL 20 MG/1
20 TABLET ORAL DAILY PRN
Qty: 90 TABLET | Refills: 3 | Status: SHIPPED | OUTPATIENT
Start: 2025-04-28

## 2025-04-28 NOTE — ASSESSMENT & PLAN NOTE
Status post robotic simple prostatectomy performed June 2024  Patient is very satisfied with his voiding pattern off of pharmacotherapy following his simple prostatectomy procedure.  We will continue to monitor for worsening/progression of lower urinary tract symptoms

## 2025-04-28 NOTE — PROGRESS NOTES
4/28/2025      Assessment and Plan    69 y.o. male managed by Dr. Cowan    Erectile dysfunction following simple prostatectomy  Patient reports that the Levitra was found to be too cost prohibitive.  Patient reinitiated therapy with the Cialis 20 mg.  Patient currently content with his erectile function on Cialis 20 mg as needed.  Patient denies any side effects to the medication.  We discussed further options for therapy including utilization of a penile pump, intracavernosal injection therapy, or placement of a penile prosthesis.  However, the patient defers any of these options.  Patient will continue on Cialis 20 mg as needed 1 hour prior to sexual activity.  Refills provided today.    Urinary retention  Status post robotic simple prostatectomy performed June 2024  Patient is very satisfied with his voiding pattern off of pharmacotherapy following his simple prostatectomy procedure.  We will continue to monitor for worsening/progression of lower urinary tract symptoms    Screening for prostate cancer  Patient previously underwent PSA testing in outside facility that was found to be 4.8 as well as a multiparametric MRI of the prostate performed May 3, 2024 which noted 2 PI-RADS category 3 lesions.  Patient underwent simple prostatectomy in June 2024 and 92 g of benign prostate tissue was noted on pathology  Patient's most recent PSA was performed 1/27/2025 and found to be 0.10.  We discussed that the patient can repeat his PSA in 1 year from his last.        History of Present Illness  Percy Duenas is a 69 y.o. male here for evaluation of history of urinary retention requiring chronic Davis catheter so she with a large prostate who underwent robotic simple prostatectomy performed June 2024.     Patient had been catheter dependent for 3 months for his urinary retention.  There was development of persistent clots for which she was self irrigating.  Dr. Monterroso performed cystoscopy which showed by lobar  hyperplasia with intravesical prostatic protrusion with mild to moderate bladder trabeculations.  The patient's MRI of the prostate showed a gland measuring approximately 160 g in size.  Patient underwent simple prostatectomy in June 2024 with pathology showing 92 g of benign tissue.     At his last office visit on 7/29/2024, he noted he was voiding adequately with a very strong stream.  He endorsed minimal leakage and a volume of perhaps 1 tablespoon/day, but denied any stress urinary incontinence.     Patient underwent PSA testing at an outside facility which was apparently 4.8 resulting in an MRI of the prostate reported May 3, 2024 and greater the patient is having 2 PI-RADS category 3 lesions and a conversation was had about these equivocal findings with Dr. Monterroso.  There was a conversation with Dr. Monterroso of the role for biopsy versus moving forward with surgery which will produce tissue sampling and potentially require adjuvant radiation if clinically significant prostate cancer was found.  However, pathology from his simple prostatectomy was benign as noted above.     Furthermore, the patient reported issues of erectile dysfunction which she thinks are worse now since the surgery.  Patient had been trying Cialis 20 mg which was not providing adequate results.     Patient's most recent PSA was performed 1/27/2025 and found to be 0.10.     Patient was initiated on Levitra 20 mg as needed 1 hour prior to sexual activity after our last office visit for treatment of his erectile dysfunction.  Today, the patient reports that he did not initiate therapy with the Levitra as it was cost prohibitive.  Patient did reinitiate therapy with the Cialis that he had leftover.  Patient notes that he had experimented with the best results and timing of the medication.  Patient notes that if taken 15 minutes to 30 minutes prior to sexual intercourse that this provides significantly more benefit than when taken 1 to 2  hours before intercourse.  Patient notes that utilizing the medication this way has not provided him any side effects, and has given him the best results.  At this time, the patient defers any further therapy including a placement of IPP or intracavernosal injection therapy.  Patient is still content with his voiding pattern following his simple prostatectomy and offers no new lower urinary tract complaints.        Review of Systems   Constitutional:  Negative for chills and fever.   HENT:  Negative for ear pain and sore throat.    Eyes:  Negative for pain and visual disturbance.   Respiratory:  Negative for cough and shortness of breath.    Cardiovascular:  Negative for chest pain and palpitations.   Gastrointestinal:  Negative for abdominal pain and vomiting.   Genitourinary:  Negative for decreased urine volume, difficulty urinating, dysuria, flank pain, frequency, hematuria and urgency.   Musculoskeletal:  Negative for arthralgias and back pain.   Skin:  Negative for color change and rash.   Neurological:  Negative for seizures and syncope.   All other systems reviewed and are negative.          AUA SYMPTOM SCORE      Flowsheet Row Most Recent Value   AUA SYMPTOM SCORE    How often have you had a sensation of not emptying your bladder completely after you finished urinating? 0 (P)     How often have you had to urinate again less than two hours after you finished urinating? 1 (P)     How often have you found you stopped and started again several times when you urinate? 0 (P)     How often have you found it difficult to postpone urination? 0 (P)     How often have you had a weak urinary stream? 1 (P)     How often have you had to push or strain to begin urination? 0 (P)     How many times did you most typically get up to urinate from the time you went to bed at night until the time you got up in the morning? 3 (P)     Quality of Life: If you were to spend the rest of your life with your urinary condition just the  way it is now, how would you feel about that? 1 (P)     AUA SYMPTOM SCORE 5 (P)               Vitals  There were no vitals filed for this visit.    Physical Exam  Vitals reviewed.   Constitutional:       General: He is not in acute distress.     Appearance: Normal appearance. He is not ill-appearing.   HENT:      Head: Normocephalic and atraumatic.      Nose: Nose normal.   Eyes:      General: No scleral icterus.  Pulmonary:      Effort: No respiratory distress.   Abdominal:      General: Abdomen is flat. There is no distension.      Palpations: Abdomen is soft.      Tenderness: There is no abdominal tenderness.   Musculoskeletal:         General: Normal range of motion.      Cervical back: Normal range of motion.   Skin:     General: Skin is warm.      Coloration: Skin is not jaundiced.   Neurological:      Mental Status: He is alert and oriented to person, place, and time.      Gait: Gait normal.   Psychiatric:         Mood and Affect: Mood normal.         Behavior: Behavior normal.           Past History  Past Medical History:   Diagnosis Date    Colon polyp      Social History     Socioeconomic History    Marital status: /Civil Union     Spouse name: Not on file    Number of children: Not on file    Years of education: Not on file    Highest education level: Not on file   Occupational History    Not on file   Tobacco Use    Smoking status: Some Days     Types: Cigars    Smokeless tobacco: Never   Vaping Use    Vaping status: Never Used   Substance and Sexual Activity    Alcohol use: Yes     Comment: occ    Drug use: Never    Sexual activity: Not on file   Other Topics Concern    Not on file   Social History Narrative    Not on file     Social Drivers of Health     Financial Resource Strain: Not on file   Food Insecurity: Not on file   Transportation Needs: Not on file   Physical Activity: Not on file   Stress: Not on file   Social Connections: Unknown (6/18/2024)    Received from [facility]     How often do you feel lonely or isolated from those around you? (Adult - for ages 18 years and over): Not on file   Intimate Partner Violence: Not on file   Housing Stability: Low Risk  (4/22/2024)    Received from [facility]    Housing Stability     Unstable Housing in the Last Year: Not on file     Social History     Tobacco Use   Smoking Status Some Days    Types: Cigars   Smokeless Tobacco Never     No family history on file.    The following portions of the patient's history were reviewed and updated as appropriate: allergies, current medications, past medical history, past social history, past surgical history and problem list.    Results  No results found for this or any previous visit (from the past hour).]  No results found for: \"PSA\"  Lab Results   Component Value Date    CALCIUM 8.9 06/27/2024    K 4.8 06/27/2024    CO2 25 06/27/2024     06/27/2024    BUN 14 06/27/2024    CREATININE 1.25 06/27/2024     Lab Results   Component Value Date    WBC 14.73 (H) 06/27/2024    HGB 11.7 (L) 06/27/2024    HCT 37.2 06/27/2024     (H) 06/27/2024     06/27/2024      "

## 2025-04-28 NOTE — ASSESSMENT & PLAN NOTE
Patient reports that the Levitra was found to be too cost prohibitive.  Patient reinitiated therapy with the Cialis 20 mg.  Patient currently content with his erectile function on Cialis 20 mg as needed.  Patient denies any side effects to the medication.  We discussed further options for therapy including utilization of a penile pump, intracavernosal injection therapy, or placement of a penile prosthesis.  However, the patient defers any of these options.  Patient will continue on Cialis 20 mg as needed 1 hour prior to sexual activity.  Refills provided today.

## (undated) DEVICE — TISSUE RETRIEVAL SYSTEM: Brand: INZII RETRIEVAL SYSTEM

## (undated) DEVICE — Device

## (undated) DEVICE — INSUFFLATION NEEDLE TO ESTABLISH PNEUMOPERITONEUM.: Brand: INSUFFLATION NEEDLE

## (undated) DEVICE — SUT VICRYL 2-0 SH 27 IN UNDYED J417H

## (undated) DEVICE — TENACULUM FORCEPS: Brand: ENDOWRIST

## (undated) DEVICE — SUT STRATAFIX SMTR PDS PLUS 1 CT-1 30CM SXPP1A435

## (undated) DEVICE — SYRINGE 10ML LL

## (undated) DEVICE — DRAPE SHEET THREE QUARTER

## (undated) DEVICE — ARM DRAPE

## (undated) DEVICE — HEM-O-LOK CLIP CARTRIDGE LARGE DA VINCI SI/XI 6CLIPS/EA

## (undated) DEVICE — BAG URINE DRAINAGE 2000ML ANTI RFLX LF

## (undated) DEVICE — EXOFIN PRECISION PEN HIGH VISCOSITY TOPICAL SKIN ADHESIVE: Brand: EXOFIN PRECISION PEN, 1G

## (undated) DEVICE — TROCAR PORT ACCESS 12 X120MM W/BLDLS OPTICAL TIP AIRSEAL

## (undated) DEVICE — HEMOSTATIC MATRIX SURGIFLO 8ML W/THROMBIN

## (undated) DEVICE — SUT VICRYL 0 UR-6 27 IN J603H

## (undated) DEVICE — KIT, BETHLEHEM ROBOTIC PROST: Brand: CARDINAL HEALTH

## (undated) DEVICE — INTENDED FOR TISSUE SEPARATION, AND OTHER PROCEDURES THAT REQUIRE A SHARP SURGICAL BLADE TO PUNCTURE OR CUT.: Brand: BARD-PARKER SAFETY BLADES SIZE 11, STERILE

## (undated) DEVICE — LARGE NEEDLE DRIVER: Brand: ENDOWRIST

## (undated) DEVICE — DECANTER: Brand: UNBRANDED

## (undated) DEVICE — SUT VLOC 90 3-0 CV-23 9 IN VLOCM0844

## (undated) DEVICE — SUT VLOC 90 3-0 90 CV-23 L9 IN VLOCM1944

## (undated) DEVICE — TROCAR: Brand: KII FIOS FIRST ENTRY

## (undated) DEVICE — SURGICEL 4 X 8IN

## (undated) DEVICE — Device: Brand: OMNICLOSE TROCAR SITE CLOSURE DEVICE

## (undated) DEVICE — CHLORAPREP HI-LITE 26ML ORANGE

## (undated) DEVICE — COLUMN DRAPE

## (undated) DEVICE — GLOVE INDICATOR PI UNDERGLOVE SZ 8 BLUE

## (undated) DEVICE — ASTOUND STANDARD SURGICAL GOWN, XL: Brand: CONVERTORS

## (undated) DEVICE — 40601 PROLONGED POSITIONING SYSTEM: Brand: 40601 PROLONGED POSITIONING SYSTEM

## (undated) DEVICE — CANNULA SEAL

## (undated) DEVICE — DRAPE SHEET X-LG

## (undated) DEVICE — CYSTO TUBING TUR Y IRRIGATION

## (undated) DEVICE — TIP COVER ACCESSORY

## (undated) DEVICE — GLOVE SRG BIOGEL 7.5

## (undated) DEVICE — HEAVY DUTY TABLE COVER: Brand: CONVERTORS

## (undated) DEVICE — VISUALIZATION SYSTEM: Brand: CLEARIFY

## (undated) DEVICE — AIRSEAL TUBE SMOKE EVAC LUMENX3 FILTERED

## (undated) DEVICE — BAG URINE DRAINAGE 4000ML CONTINUOUS IRR

## (undated) DEVICE — STERILE LUBRICATING JELLY, TUBE: Brand: HR LUBRICATING JELLY

## (undated) DEVICE — SURGIFLO ENDOSCOPIC APPICATOR: Brand: ETHICON

## (undated) DEVICE — MONOPOLAR CURVED SCISSORS: Brand: ENDOWRIST

## (undated) DEVICE — SUT MONOCRYL 4-0 PS-2 27 IN Y426H